# Patient Record
Sex: FEMALE | Race: WHITE | Employment: FULL TIME | URBAN - METROPOLITAN AREA
[De-identification: names, ages, dates, MRNs, and addresses within clinical notes are randomized per-mention and may not be internally consistent; named-entity substitution may affect disease eponyms.]

---

## 2019-01-11 DIAGNOSIS — Z34.00 ENCOUNTER FOR SUPERVISION OF NORMAL INTRAUTERINE PREGNANCY IN PRIMIGRAVIDA, ANTEPARTUM: ICD-10-CM

## 2019-01-11 LAB
ABO/RH: NORMAL
AMPHETAMINE SCREEN, URINE: NORMAL
ANTIBODY SCREEN: NORMAL
BACTERIA: ABNORMAL /HPF
BARBITURATE SCREEN URINE: NORMAL
BASOPHILS ABSOLUTE: 0 K/UL (ref 0–0.2)
BASOPHILS RELATIVE PERCENT: 0.4 %
BENZODIAZEPINE SCREEN, URINE: NORMAL
BILIRUBIN URINE: ABNORMAL
BLOOD, URINE: NEGATIVE
CANNABINOID SCREEN URINE: NORMAL
CLARITY: ABNORMAL
COCAINE METABOLITE SCREEN URINE: NORMAL
COLOR: ABNORMAL
EOSINOPHILS ABSOLUTE: 0 K/UL (ref 0–0.7)
EOSINOPHILS RELATIVE PERCENT: 0.1 %
EPITHELIAL CELLS, UA: ABNORMAL /HPF (ref 0–5)
GLUCOSE BLD-MCNC: 76 MG/DL (ref 74–109)
GLUCOSE URINE: NEGATIVE MG/DL
GONADOTROPIN, CHORIONIC (HCG) QUANT: NORMAL MIU/ML
HBA1C MFR BLD: 4.7 % (ref 4.8–5.9)
HCT VFR BLD CALC: 37.5 % (ref 37–47)
HEMOGLOBIN: 13 G/DL (ref 12–16)
HEPATITIS B SURFACE ANTIGEN INTERPRETATION: NORMAL
KETONES, URINE: 15 MG/DL
LEUKOCYTE ESTERASE, URINE: ABNORMAL
LYMPHOCYTES ABSOLUTE: 1.6 K/UL (ref 1–4.8)
LYMPHOCYTES RELATIVE PERCENT: 14.7 %
Lab: NORMAL
MCH RBC QN AUTO: 30 PG (ref 27–31.3)
MCHC RBC AUTO-ENTMCNC: 34.8 % (ref 33–37)
MCV RBC AUTO: 86.1 FL (ref 82–100)
MONOCYTES ABSOLUTE: 0.5 K/UL (ref 0.2–0.8)
MONOCYTES RELATIVE PERCENT: 4.5 %
NEUTROPHILS ABSOLUTE: 8.9 K/UL (ref 1.4–6.5)
NEUTROPHILS RELATIVE PERCENT: 80.3 %
NITRITE, URINE: NEGATIVE
OPIATE SCREEN URINE: NORMAL
PDW BLD-RTO: 11.9 % (ref 11.5–14.5)
PH UA: 5.5 (ref 5–9)
PHENCYCLIDINE SCREEN URINE: NORMAL
PLATELET # BLD: 228 K/UL (ref 130–400)
PROTEIN UA: ABNORMAL MG/DL
RBC # BLD: 4.35 M/UL (ref 4.2–5.4)
RUBELLA ANTIBODY IGG: 28.3 IU/ML
SPECIFIC GRAVITY UA: 1.03 (ref 1–1.03)
TSH SERPL DL<=0.05 MIU/L-ACNC: 2.08 UIU/ML (ref 0.27–4.2)
UROBILINOGEN, URINE: 0.2 E.U./DL
WBC # BLD: 11.1 K/UL (ref 4.8–10.8)
WBC UA: ABNORMAL /HPF (ref 0–5)

## 2019-01-13 LAB
GENITAL CULTURE, ROUTINE: NORMAL
HIV 1,2 COMBO ANTIGEN/ANTIBODY: NEGATIVE
RPR: NORMAL
URINE CULTURE, ROUTINE: NORMAL

## 2019-01-14 LAB — VZV IGG SER QL IA: 0.52

## 2019-01-17 LAB
C. TRACHOMATIS DNA,THIN PREP: NEGATIVE
N. GONORRHOEAE DNA, THIN PREP: NEGATIVE

## 2019-03-01 DIAGNOSIS — N30.00 ACUTE CYSTITIS WITHOUT HEMATURIA: ICD-10-CM

## 2019-03-01 LAB
BACTERIA: ABNORMAL /HPF
BILIRUBIN URINE: NEGATIVE
BLOOD, URINE: ABNORMAL
CLARITY: ABNORMAL
COLOR: YELLOW
EPITHELIAL CELLS, UA: >100 /HPF (ref 0–5)
GLUCOSE URINE: NEGATIVE MG/DL
KETONES, URINE: ABNORMAL MG/DL
LEUKOCYTE ESTERASE, URINE: ABNORMAL
NITRITE, URINE: NEGATIVE
PH UA: 5.5 (ref 5–9)
PROTEIN UA: 30 MG/DL
RBC UA: ABNORMAL /HPF (ref 0–2)
SPECIFIC GRAVITY UA: 1.03 (ref 1–1.03)
UROBILINOGEN, URINE: 1 E.U./DL
WBC UA: >100 /HPF (ref 0–5)

## 2019-03-03 LAB
ORGANISM: ABNORMAL
URINE CULTURE, ROUTINE: ABNORMAL
URINE CULTURE, ROUTINE: ABNORMAL

## 2019-06-20 ENCOUNTER — ROUTINE PRENATAL (OUTPATIENT)
Dept: OBGYN CLINIC | Age: 24
End: 2019-06-20

## 2019-06-20 VITALS — SYSTOLIC BLOOD PRESSURE: 112 MMHG | BODY MASS INDEX: 26.04 KG/M2 | WEIGHT: 147 LBS | DIASTOLIC BLOOD PRESSURE: 64 MMHG

## 2019-06-20 DIAGNOSIS — Z34.00 ENCOUNTER FOR SUPERVISION OF NORMAL INTRAUTERINE PREGNANCY IN PRIMIGRAVIDA, ANTEPARTUM: Primary | ICD-10-CM

## 2019-06-20 LAB
BILIRUBIN, POC: NORMAL
BLOOD URINE, POC: NORMAL
CLARITY, POC: NORMAL
COLOR, POC: NORMAL
GLUCOSE URINE, POC: NORMAL
KETONES, POC: NORMAL
LEUKOCYTE EST, POC: NORMAL
NITRITE, POC: NORMAL
PH, POC: NORMAL
PROTEIN, POC: NORMAL
SPECIFIC GRAVITY, POC: NORMAL
UROBILINOGEN, POC: NORMAL

## 2019-06-20 PROCEDURE — 0502F SUBSEQUENT PRENATAL CARE: CPT | Performed by: OBSTETRICS & GYNECOLOGY

## 2019-06-20 NOTE — PROGRESS NOTES
Still working at [employer] as a .  Doing ok with it but getting more uncomfortable  +fm  No lof vb,uc  fht 145  Gtt was normal  hgb 9.8 taking iron  Fu 2 weeks  Ok to continue working,rest prn and increase fluids

## 2019-07-09 ENCOUNTER — ROUTINE PRENATAL (OUTPATIENT)
Dept: OBGYN CLINIC | Age: 24
End: 2019-07-09

## 2019-07-09 VITALS — SYSTOLIC BLOOD PRESSURE: 92 MMHG | WEIGHT: 146 LBS | DIASTOLIC BLOOD PRESSURE: 56 MMHG | BODY MASS INDEX: 25.86 KG/M2

## 2019-07-09 DIAGNOSIS — Z34.93 PRENATAL CARE IN THIRD TRIMESTER: Primary | ICD-10-CM

## 2019-07-09 PROCEDURE — 0502F SUBSEQUENT PRENATAL CARE: CPT | Performed by: OBSTETRICS & GYNECOLOGY

## 2019-07-16 ENCOUNTER — HOSPITAL ENCOUNTER (OUTPATIENT)
Age: 24
Discharge: HOME | End: 2019-07-16
Attending: OBSTETRICS & GYNECOLOGY | Admitting: OBSTETRICS & GYNECOLOGY
Payer: COMMERCIAL

## 2019-07-16 VITALS — WEIGHT: 152.5 LBS | HEIGHT: 63 IN | RESPIRATION RATE: 16 BRPM | TEMPERATURE: 98.9 F | BODY MASS INDEX: 27.02 KG/M2

## 2019-07-16 PROCEDURE — 99282 EMERGENCY DEPT VISIT SF MDM: CPT | Performed by: OBSTETRICS & GYNECOLOGY

## 2019-07-16 PROCEDURE — 99283 EMERGENCY DEPT VISIT LOW MDM: CPT

## 2019-07-16 PROCEDURE — 59025 FETAL NON-STRESS TEST: CPT | Performed by: OBSTETRICS & GYNECOLOGY

## 2019-07-18 ENCOUNTER — ROUTINE PRENATAL (OUTPATIENT)
Dept: OBGYN CLINIC | Age: 24
End: 2019-07-18

## 2019-07-18 VITALS — SYSTOLIC BLOOD PRESSURE: 118 MMHG | WEIGHT: 154 LBS | DIASTOLIC BLOOD PRESSURE: 62 MMHG | BODY MASS INDEX: 27.28 KG/M2

## 2019-07-18 DIAGNOSIS — Z34.93 PRENATAL CARE IN THIRD TRIMESTER: ICD-10-CM

## 2019-07-18 DIAGNOSIS — Z34.93 PRENATAL CARE IN THIRD TRIMESTER: Primary | ICD-10-CM

## 2019-07-18 PROCEDURE — 0502F SUBSEQUENT PRENATAL CARE: CPT | Performed by: OBSTETRICS & GYNECOLOGY

## 2019-07-21 LAB — GENITAL CULTURE, ROUTINE: NORMAL

## 2019-07-25 ENCOUNTER — ROUTINE PRENATAL (OUTPATIENT)
Dept: OBGYN CLINIC | Age: 24
End: 2019-07-25

## 2019-07-25 VITALS — SYSTOLIC BLOOD PRESSURE: 114 MMHG | BODY MASS INDEX: 27.28 KG/M2 | DIASTOLIC BLOOD PRESSURE: 64 MMHG | WEIGHT: 154 LBS

## 2019-07-25 DIAGNOSIS — M54.50 LOW BACK PAIN DURING PREGNANCY, THIRD TRIMESTER: ICD-10-CM

## 2019-07-25 DIAGNOSIS — O26.893 LOW BACK PAIN DURING PREGNANCY, THIRD TRIMESTER: ICD-10-CM

## 2019-07-25 DIAGNOSIS — Z34.93 PRENATAL CARE IN THIRD TRIMESTER: Primary | ICD-10-CM

## 2019-07-25 PROCEDURE — 0502F SUBSEQUENT PRENATAL CARE: CPT | Performed by: OBSTETRICS & GYNECOLOGY

## 2019-07-25 NOTE — PROGRESS NOTES
Fatigue, otherwise well Normal vision: sees adequately in most situations; can see medication labels, newsprint

## 2019-07-31 ENCOUNTER — TELEPHONE (OUTPATIENT)
Dept: OBGYN CLINIC | Age: 24
End: 2019-07-31

## 2019-07-31 ENCOUNTER — HOSPITAL ENCOUNTER (OUTPATIENT)
Age: 24
Discharge: HOME | End: 2019-07-31
Attending: OBSTETRICS & GYNECOLOGY | Admitting: OBSTETRICS & GYNECOLOGY
Payer: COMMERCIAL

## 2019-07-31 VITALS
HEIGHT: 63 IN | WEIGHT: 154 LBS | SYSTOLIC BLOOD PRESSURE: 99 MMHG | RESPIRATION RATE: 20 BRPM | BODY MASS INDEX: 27.29 KG/M2 | DIASTOLIC BLOOD PRESSURE: 65 MMHG | TEMPERATURE: 99.5 F | HEART RATE: 87 BPM

## 2019-07-31 LAB
AMPHETAMINE SCREEN, URINE: NORMAL
BACTERIA: ABNORMAL /HPF
BARBITURATE SCREEN URINE: NORMAL
BENZODIAZEPINE SCREEN, URINE: NORMAL
BILIRUBIN URINE: NEGATIVE
BLOOD, URINE: NEGATIVE
CANNABINOID SCREEN URINE: NORMAL
CLARITY: ABNORMAL
COCAINE METABOLITE SCREEN URINE: NORMAL
COLOR: ABNORMAL
EPITHELIAL CELLS, UA: ABNORMAL /HPF (ref 0–5)
GLUCOSE URINE: NEGATIVE MG/DL
KETONES, URINE: NEGATIVE MG/DL
LEUKOCYTE ESTERASE, URINE: ABNORMAL
Lab: NORMAL
NITRITE, URINE: NEGATIVE
OPIATE SCREEN URINE: NORMAL
PH UA: 7 (ref 5–9)
PHENCYCLIDINE SCREEN URINE: NORMAL
PLACENTA ALPHA MICROGLOBULIN-1: NEGATIVE
PROTEIN UA: ABNORMAL MG/DL
RBC UA: ABNORMAL /HPF (ref 0–2)
SPECIFIC GRAVITY UA: 1.02 (ref 1–1.03)
UROBILINOGEN, URINE: 1 E.U./DL
WBC UA: >100 /HPF (ref 0–5)

## 2019-07-31 PROCEDURE — 99283 EMERGENCY DEPT VISIT LOW MDM: CPT | Performed by: OBSTETRICS & GYNECOLOGY

## 2019-07-31 PROCEDURE — 80307 DRUG TEST PRSMV CHEM ANLYZR: CPT

## 2019-07-31 PROCEDURE — 84112 EVAL AMNIOTIC FLUID PROTEIN: CPT

## 2019-07-31 PROCEDURE — 99283 EMERGENCY DEPT VISIT LOW MDM: CPT

## 2019-07-31 PROCEDURE — 81001 URINALYSIS AUTO W/SCOPE: CPT

## 2019-07-31 PROCEDURE — 59025 FETAL NON-STRESS TEST: CPT | Performed by: OBSTETRICS & GYNECOLOGY

## 2019-07-31 RX ORDER — NITROFURANTOIN 25; 75 MG/1; MG/1
100 CAPSULE ORAL 2 TIMES DAILY
Qty: 20 CAPSULE | Refills: 0 | Status: ON HOLD | OUTPATIENT
Start: 2019-07-31 | End: 2019-08-09 | Stop reason: HOSPADM

## 2019-07-31 NOTE — FLOWSHEET NOTE
Amnisure completed and sent to lab. Upon returning to room pt. C/O feeling lightheaded and nauseated HOB down and positioned to R Side. Cool compresses to head and neck area.

## 2019-07-31 NOTE — TELEPHONE ENCOUNTER
Pt is 37w3d. Pt believes she is leaking and having a lot of discharge. I advised pt to go to [facility] labor and delivery.

## 2019-08-01 ENCOUNTER — ROUTINE PRENATAL (OUTPATIENT)
Dept: OBGYN CLINIC | Age: 24
End: 2019-08-01

## 2019-08-01 VITALS — BODY MASS INDEX: 27.99 KG/M2 | SYSTOLIC BLOOD PRESSURE: 114 MMHG | WEIGHT: 158 LBS | DIASTOLIC BLOOD PRESSURE: 62 MMHG

## 2019-08-01 DIAGNOSIS — G47.00 INSOMNIA, UNSPECIFIED TYPE: ICD-10-CM

## 2019-08-01 DIAGNOSIS — Z34.93 PRENATAL CARE IN THIRD TRIMESTER: Primary | ICD-10-CM

## 2019-08-01 PROCEDURE — 0502F SUBSEQUENT PRENATAL CARE: CPT | Performed by: OBSTETRICS & GYNECOLOGY

## 2019-08-01 NOTE — PROGRESS NOTES
Patient was seen yesterday in labor and delivery thought her membranes ruptured but that was negative  Patient not sleeping well  Positive fetal movement fetal heart tones 141  No loss of fluid or vaginal bleeding  Advised to try Benadryl 25 mg at bedtime melatonin or Unisom patient to follow-up in 1 week

## 2019-08-05 RX ORDER — BUTALBITAL, ACETAMINOPHEN AND CAFFEINE 50; 325; 40 MG/1; MG/1; MG/1
TABLET ORAL
Qty: 30 TABLET | Refills: 3 | Status: ON HOLD | OUTPATIENT
Start: 2019-08-05 | End: 2019-08-09 | Stop reason: HOSPADM

## 2019-08-09 ENCOUNTER — HOSPITAL ENCOUNTER (OUTPATIENT)
Age: 24
Setting detail: OBSERVATION
Discharge: HOME | End: 2019-08-09
Attending: OBSTETRICS & GYNECOLOGY | Admitting: OBSTETRICS & GYNECOLOGY
Payer: COMMERCIAL

## 2019-08-09 VITALS
RESPIRATION RATE: 16 BRPM | DIASTOLIC BLOOD PRESSURE: 61 MMHG | TEMPERATURE: 98.6 F | HEART RATE: 90 BPM | SYSTOLIC BLOOD PRESSURE: 104 MMHG

## 2019-08-09 LAB
ALBUMIN SERPL-MCNC: 3.3 G/DL (ref 3.5–4.6)
ALP BLD-CCNC: 145 U/L (ref 40–130)
ALT SERPL-CCNC: 8 U/L (ref 0–33)
AMPHETAMINE SCREEN, URINE: NORMAL
ANION GAP SERPL CALCULATED.3IONS-SCNC: 13 MEQ/L (ref 9–15)
AST SERPL-CCNC: 13 U/L (ref 0–35)
BACTERIA: ABNORMAL /HPF
BARBITURATE SCREEN URINE: NORMAL
BASOPHILS ABSOLUTE: 0.1 K/UL (ref 0–0.2)
BASOPHILS RELATIVE PERCENT: 0.8 %
BENZODIAZEPINE SCREEN, URINE: NORMAL
BILIRUB SERPL-MCNC: 0.3 MG/DL (ref 0.2–0.7)
BILIRUBIN URINE: NEGATIVE
BLOOD, URINE: NEGATIVE
BUN BLDV-MCNC: 6 MG/DL (ref 6–20)
CALCIUM SERPL-MCNC: 8.6 MG/DL (ref 8.5–9.9)
CANNABINOID SCREEN URINE: NORMAL
CHLORIDE BLD-SCNC: 105 MEQ/L (ref 95–107)
CLARITY: CLEAR
CO2: 21 MEQ/L (ref 20–31)
COCAINE METABOLITE SCREEN URINE: NORMAL
COLOR: YELLOW
CREAT SERPL-MCNC: 0.43 MG/DL (ref 0.5–0.9)
EOSINOPHILS ABSOLUTE: 0 K/UL (ref 0–0.7)
EOSINOPHILS RELATIVE PERCENT: 0.5 %
EPITHELIAL CELLS, UA: ABNORMAL /HPF (ref 0–5)
GFR AFRICAN AMERICAN: >60
GFR NON-AFRICAN AMERICAN: >60
GLOBULIN: 3 G/DL (ref 2.3–3.5)
GLUCOSE BLD-MCNC: 76 MG/DL (ref 70–99)
GLUCOSE URINE: NEGATIVE MG/DL
HCT VFR BLD CALC: 27.8 % (ref 37–47)
HEMOGLOBIN: 9.6 G/DL (ref 12–16)
HYALINE CASTS: ABNORMAL /HPF (ref 0–5)
KETONES, URINE: NEGATIVE MG/DL
LEUKOCYTE ESTERASE, URINE: ABNORMAL
LYMPHOCYTES ABSOLUTE: 2.4 K/UL (ref 1–4.8)
LYMPHOCYTES RELATIVE PERCENT: 24.9 %
Lab: NORMAL
MCH RBC QN AUTO: 27.2 PG (ref 27–31.3)
MCHC RBC AUTO-ENTMCNC: 34.5 % (ref 33–37)
MCV RBC AUTO: 79 FL (ref 82–100)
MONOCYTES ABSOLUTE: 0.7 K/UL (ref 0.2–0.8)
MONOCYTES RELATIVE PERCENT: 6.9 %
NEUTROPHILS ABSOLUTE: 6.5 K/UL (ref 1.4–6.5)
NEUTROPHILS RELATIVE PERCENT: 66.9 %
NITRITE, URINE: NEGATIVE
OPIATE SCREEN URINE: NORMAL
PDW BLD-RTO: 14 % (ref 11.5–14.5)
PH UA: 7 (ref 5–9)
PHENCYCLIDINE SCREEN URINE: NORMAL
PLATELET # BLD: 164 K/UL (ref 130–400)
POTASSIUM SERPL-SCNC: 3.9 MEQ/L (ref 3.4–4.9)
PROTEIN UA: NEGATIVE MG/DL
RBC # BLD: 3.52 M/UL (ref 4.2–5.4)
RBC UA: ABNORMAL /HPF (ref 0–5)
SODIUM BLD-SCNC: 139 MEQ/L (ref 135–144)
SPECIFIC GRAVITY UA: 1.01 (ref 1–1.03)
TOTAL PROTEIN: 6.3 G/DL (ref 6.3–8)
UROBILINOGEN, URINE: 0.2 E.U./DL
WBC # BLD: 9.8 K/UL (ref 4.8–10.8)
WBC UA: ABNORMAL /HPF (ref 0–5)

## 2019-08-09 PROCEDURE — 36415 COLL VENOUS BLD VENIPUNCTURE: CPT

## 2019-08-09 PROCEDURE — 85025 COMPLETE CBC W/AUTO DIFF WBC: CPT

## 2019-08-09 PROCEDURE — 81001 URINALYSIS AUTO W/SCOPE: CPT

## 2019-08-09 PROCEDURE — 2580000003 HC RX 258: Performed by: OBSTETRICS & GYNECOLOGY

## 2019-08-09 PROCEDURE — 96366 THER/PROPH/DIAG IV INF ADDON: CPT

## 2019-08-09 PROCEDURE — 6360000002 HC RX W HCPCS: Performed by: OBSTETRICS & GYNECOLOGY

## 2019-08-09 PROCEDURE — 87086 URINE CULTURE/COLONY COUNT: CPT

## 2019-08-09 PROCEDURE — 99284 EMERGENCY DEPT VISIT MOD MDM: CPT

## 2019-08-09 PROCEDURE — 6370000000 HC RX 637 (ALT 250 FOR IP): Performed by: OBSTETRICS & GYNECOLOGY

## 2019-08-09 PROCEDURE — 99218 PR INITIAL OBSERVATION CARE/DAY 30 MINUTES: CPT | Performed by: OBSTETRICS & GYNECOLOGY

## 2019-08-09 PROCEDURE — 80053 COMPREHEN METABOLIC PANEL: CPT

## 2019-08-09 PROCEDURE — 96365 THER/PROPH/DIAG IV INF INIT: CPT

## 2019-08-09 PROCEDURE — 80307 DRUG TEST PRSMV CHEM ANLYZR: CPT

## 2019-08-09 PROCEDURE — G0378 HOSPITAL OBSERVATION PER HR: HCPCS

## 2019-08-09 RX ORDER — SODIUM CHLORIDE, SODIUM LACTATE, POTASSIUM CHLORIDE, CALCIUM CHLORIDE 600; 310; 30; 20 MG/100ML; MG/100ML; MG/100ML; MG/100ML
INJECTION, SOLUTION INTRAVENOUS CONTINUOUS
Status: DISCONTINUED | OUTPATIENT
Start: 2019-08-09 | End: 2019-08-09 | Stop reason: HOSPADM

## 2019-08-09 RX ORDER — ACETAMINOPHEN 325 MG/1
650 TABLET ORAL EVERY 4 HOURS PRN
Status: DISCONTINUED | OUTPATIENT
Start: 2019-08-09 | End: 2019-08-09 | Stop reason: HOSPADM

## 2019-08-09 RX ORDER — CEPHALEXIN 500 MG/1
500 CAPSULE ORAL 2 TIMES DAILY
Qty: 14 CAPSULE | Refills: 0 | Status: ON HOLD | OUTPATIENT
Start: 2019-08-09 | End: 2020-11-03 | Stop reason: ALTCHOICE

## 2019-08-09 RX ADMIN — SODIUM CHLORIDE, POTASSIUM CHLORIDE, SODIUM LACTATE AND CALCIUM CHLORIDE: 600; 310; 30; 20 INJECTION, SOLUTION INTRAVENOUS at 13:07

## 2019-08-09 RX ADMIN — SODIUM CHLORIDE, POTASSIUM CHLORIDE, SODIUM LACTATE AND CALCIUM CHLORIDE: 600; 310; 30; 20 INJECTION, SOLUTION INTRAVENOUS at 10:51

## 2019-08-09 RX ADMIN — DEXTROSE MONOHYDRATE 1 G: 5 INJECTION INTRAVENOUS at 11:05

## 2019-08-09 RX ADMIN — ACETAMINOPHEN 650 MG: 325 TABLET ORAL at 09:41

## 2019-08-09 ASSESSMENT — PAIN SCALES - GENERAL: PAINLEVEL_OUTOF10: 6

## 2019-08-09 NOTE — H&P
Department of Obstetrics and Gynecology   History & Physical    Pt Name: Katherine Rodríguez  MRN: [id number] Acct #: [de-identified]  YOB: 1995  Estimated Date of Delivery: 19      HPI: The patient is a 27 y.o.  female whose ARMANDO was 19; now 44w7d gest and presents with ctx's and lower abdominal pain; seeing Dr. Praveen Mills for [facility]; currently on macrobid, 100 mg po bid for uti; neg maternal GBS status; has received iv hydration and ancef, 1 gm ivpb; was noted to be 1-2 cm dilated with nursing exam and this has not changed since OBS status began; will have patient d/c macrobid and d/c home on keflex, 500 mg po bid x's 7 days and f/u with ob/gyn next week; otherwise return to ob/er if labor ensues, etc.    Allergies: Allergies as of 2019    (No Known Allergies)       Medications:    Current Facility-Administered Medications:     acetaminophen (TYLENOL) tablet 650 mg, 650 mg, Oral, Q4H PRN, Martin Rose MD, 650 mg at 19 0941    lactated ringers infusion, , Intravenous, Continuous, Martin Rose MD, Last Rate: 125 mL/hr at 19 1307    ceFAZolin (ANCEF) 1 g in dextrose 5 % 50 mL IVPB (mini-bag), 1 g, Intravenous, Q8H, Martin Rose MD    OB History:     Gyn History: Denies h/o abnormal pap smear, h/o STDs. Past Medical History:   Past Medical History:   Diagnosis Date    Anemia     Anxiety     Depression        Past Surgical History: History reviewed. No pertinent surgical history. Social History:   Social History     Tobacco Use   Smoking Status Former Smoker   Smokeless Tobacco Never Used        Family History: Noncontributory; Denies h/o cancer. ROS:  Negative except as stated in HPI, denies nausea, vomiting, fever, chills, headache or dysuria.      PE:  Vitals:    19 1303   BP: (!) 98/51   Pulse: 73   Resp: 18   Temp:        General: well nourished, well developed, in no acute distress  CV: Normal heart sounds  Resp: breathing

## 2019-08-10 LAB — URINE CULTURE, ROUTINE: NORMAL

## 2019-08-13 ENCOUNTER — ANESTHESIA (OUTPATIENT)
Dept: LABOR AND DELIVERY | Age: 24
End: 2019-08-13
Payer: COMMERCIAL

## 2019-08-13 ENCOUNTER — HOSPITAL ENCOUNTER (INPATIENT)
Age: 24
LOS: 2 days | Discharge: HOME | End: 2019-08-15
Attending: OBSTETRICS & GYNECOLOGY | Admitting: OBSTETRICS & GYNECOLOGY
Payer: COMMERCIAL

## 2019-08-13 ENCOUNTER — ROUTINE PRENATAL (OUTPATIENT)
Dept: OBGYN CLINIC | Age: 24
End: 2019-08-13

## 2019-08-13 ENCOUNTER — ANESTHESIA EVENT (OUTPATIENT)
Dept: LABOR AND DELIVERY | Age: 24
End: 2019-08-13
Payer: COMMERCIAL

## 2019-08-13 VITALS — DIASTOLIC BLOOD PRESSURE: 64 MMHG | WEIGHT: 158 LBS | BODY MASS INDEX: 27.99 KG/M2 | SYSTOLIC BLOOD PRESSURE: 104 MMHG

## 2019-08-13 DIAGNOSIS — Z34.93 PRENATAL CARE IN THIRD TRIMESTER: Primary | ICD-10-CM

## 2019-08-13 PROBLEM — Z78.9 ADMITTED TO LABOR AND DELIVERY: Status: ACTIVE | Noted: 2019-08-13

## 2019-08-13 LAB
ABO/RH: NORMAL
ALBUMIN SERPL-MCNC: 3.7 G/DL (ref 3.5–4.6)
ALP BLD-CCNC: 159 U/L (ref 40–130)
ALT SERPL-CCNC: 9 U/L (ref 0–33)
AMPHETAMINE SCREEN, URINE: NORMAL
ANION GAP SERPL CALCULATED.3IONS-SCNC: 15 MEQ/L (ref 9–15)
ANTIBODY SCREEN: NORMAL
AST SERPL-CCNC: 17 U/L (ref 0–35)
BACTERIA: ABNORMAL /HPF
BARBITURATE SCREEN URINE: NORMAL
BASOPHILS ABSOLUTE: 0.1 K/UL (ref 0–0.2)
BASOPHILS RELATIVE PERCENT: 0.6 %
BENZODIAZEPINE SCREEN, URINE: NORMAL
BILIRUB SERPL-MCNC: 0.4 MG/DL (ref 0.2–0.7)
BILIRUBIN URINE: NEGATIVE
BLOOD, URINE: ABNORMAL
BUN BLDV-MCNC: 6 MG/DL (ref 6–20)
CALCIUM SERPL-MCNC: 8.9 MG/DL (ref 8.5–9.9)
CANNABINOID SCREEN URINE: NORMAL
CHLORIDE BLD-SCNC: 102 MEQ/L (ref 95–107)
CLARITY: ABNORMAL
CO2: 19 MEQ/L (ref 20–31)
COCAINE METABOLITE SCREEN URINE: NORMAL
COLOR: ABNORMAL
CREAT SERPL-MCNC: 0.4 MG/DL (ref 0.5–0.9)
EOSINOPHILS ABSOLUTE: 0 K/UL (ref 0–0.7)
EOSINOPHILS RELATIVE PERCENT: 0.3 %
EPITHELIAL CELLS, UA: ABNORMAL /HPF (ref 0–5)
GFR AFRICAN AMERICAN: >60
GFR NON-AFRICAN AMERICAN: >60
GLOBULIN: 3.1 G/DL (ref 2.3–3.5)
GLUCOSE BLD-MCNC: 73 MG/DL (ref 70–99)
GLUCOSE URINE: NEGATIVE MG/DL
HCT VFR BLD CALC: 28.8 % (ref 37–47)
HEMOGLOBIN: 9.7 G/DL (ref 12–16)
HEPATITIS B SURFACE ANTIGEN INTERPRETATION: NORMAL
KETONES, URINE: NEGATIVE MG/DL
LEUKOCYTE ESTERASE, URINE: ABNORMAL
LYMPHOCYTES ABSOLUTE: 2.1 K/UL (ref 1–4.8)
LYMPHOCYTES RELATIVE PERCENT: 21.4 %
Lab: NORMAL
MCH RBC QN AUTO: 26.1 PG (ref 27–31.3)
MCHC RBC AUTO-ENTMCNC: 33.6 % (ref 33–37)
MCV RBC AUTO: 77.6 FL (ref 82–100)
MONOCYTES ABSOLUTE: 0.7 K/UL (ref 0.2–0.8)
MONOCYTES RELATIVE PERCENT: 7.1 %
NEUTROPHILS ABSOLUTE: 7 K/UL (ref 1.4–6.5)
NEUTROPHILS RELATIVE PERCENT: 70.6 %
NITRITE, URINE: NEGATIVE
OPIATE SCREEN URINE: NORMAL
PDW BLD-RTO: 14 % (ref 11.5–14.5)
PH UA: 6.5 (ref 5–9)
PHENCYCLIDINE SCREEN URINE: NORMAL
PLATELET # BLD: 168 K/UL (ref 130–400)
POTASSIUM SERPL-SCNC: 3.8 MEQ/L (ref 3.4–4.9)
PROTEIN UA: NEGATIVE MG/DL
RBC # BLD: 3.71 M/UL (ref 4.2–5.4)
RBC UA: ABNORMAL /HPF (ref 0–5)
RENAL EPITHELIAL, UA: ABNORMAL /HPF
SODIUM BLD-SCNC: 136 MEQ/L (ref 135–144)
SPECIFIC GRAVITY UA: 1.02 (ref 1–1.03)
TOTAL PROTEIN: 6.8 G/DL (ref 6.3–8)
UROBILINOGEN, URINE: 1 E.U./DL
WBC # BLD: 10 K/UL (ref 4.8–10.8)
WBC UA: ABNORMAL /HPF (ref 0–5)

## 2019-08-13 PROCEDURE — G8419 CALC BMI OUT NRM PARAM NOF/U: HCPCS | Performed by: OBSTETRICS & GYNECOLOGY

## 2019-08-13 PROCEDURE — 86901 BLOOD TYPING SEROLOGIC RH(D): CPT

## 2019-08-13 PROCEDURE — 2500000003 HC RX 250 WO HCPCS: Performed by: NURSE ANESTHETIST, CERTIFIED REGISTERED

## 2019-08-13 PROCEDURE — 86592 SYPHILIS TEST NON-TREP QUAL: CPT

## 2019-08-13 PROCEDURE — 87340 HEPATITIS B SURFACE AG IA: CPT

## 2019-08-13 PROCEDURE — 6370000000 HC RX 637 (ALT 250 FOR IP): Performed by: OBSTETRICS & GYNECOLOGY

## 2019-08-13 PROCEDURE — 86850 RBC ANTIBODY SCREEN: CPT

## 2019-08-13 PROCEDURE — 81001 URINALYSIS AUTO W/SCOPE: CPT

## 2019-08-13 PROCEDURE — G8427 DOCREV CUR MEDS BY ELIG CLIN: HCPCS | Performed by: OBSTETRICS & GYNECOLOGY

## 2019-08-13 PROCEDURE — 6360000002 HC RX W HCPCS: Performed by: NURSE ANESTHETIST, CERTIFIED REGISTERED

## 2019-08-13 PROCEDURE — 80307 DRUG TEST PRSMV CHEM ANLYZR: CPT

## 2019-08-13 PROCEDURE — 2500000003 HC RX 250 WO HCPCS: Performed by: OBSTETRICS & GYNECOLOGY

## 2019-08-13 PROCEDURE — 3700000025 EPIDURAL BLOCK: Performed by: NURSE ANESTHETIST, CERTIFIED REGISTERED

## 2019-08-13 PROCEDURE — 2580000003 HC RX 258: Performed by: OBSTETRICS & GYNECOLOGY

## 2019-08-13 PROCEDURE — 86900 BLOOD TYPING SEROLOGIC ABO: CPT

## 2019-08-13 PROCEDURE — 36415 COLL VENOUS BLD VENIPUNCTURE: CPT

## 2019-08-13 PROCEDURE — 1036F TOBACCO NON-USER: CPT | Performed by: OBSTETRICS & GYNECOLOGY

## 2019-08-13 PROCEDURE — 1220000000 HC SEMI PRIVATE OB R&B

## 2019-08-13 PROCEDURE — 99223 1ST HOSP IP/OBS HIGH 75: CPT | Performed by: OBSTETRICS & GYNECOLOGY

## 2019-08-13 PROCEDURE — 0502F SUBSEQUENT PRENATAL CARE: CPT | Performed by: OBSTETRICS & GYNECOLOGY

## 2019-08-13 PROCEDURE — 80053 COMPREHEN METABOLIC PANEL: CPT

## 2019-08-13 PROCEDURE — 59200 INSERT CERVICAL DILATOR: CPT | Performed by: OBSTETRICS & GYNECOLOGY

## 2019-08-13 PROCEDURE — 85025 COMPLETE CBC W/AUTO DIFF WBC: CPT

## 2019-08-13 RX ORDER — FENTANYL CITRATE 50 UG/ML
INJECTION, SOLUTION INTRAMUSCULAR; INTRAVENOUS PRN
Status: DISCONTINUED | OUTPATIENT
Start: 2019-08-13 | End: 2019-08-14 | Stop reason: SDUPTHER

## 2019-08-13 RX ORDER — SODIUM CHLORIDE 0.9 % (FLUSH) 0.9 %
10 SYRINGE (ML) INJECTION PRN
Status: DISCONTINUED | OUTPATIENT
Start: 2019-08-13 | End: 2019-08-14

## 2019-08-13 RX ORDER — NALBUPHINE HCL 10 MG/ML
10 AMPUL (ML) INJECTION
Status: DISCONTINUED | OUTPATIENT
Start: 2019-08-13 | End: 2019-08-14

## 2019-08-13 RX ORDER — SODIUM CHLORIDE 0.9 % (FLUSH) 0.9 %
10 SYRINGE (ML) INJECTION EVERY 12 HOURS SCHEDULED
Status: DISCONTINUED | OUTPATIENT
Start: 2019-08-13 | End: 2019-08-14

## 2019-08-13 RX ORDER — ONDANSETRON 2 MG/ML
4 INJECTION INTRAMUSCULAR; INTRAVENOUS EVERY 6 HOURS PRN
Status: DISCONTINUED | OUTPATIENT
Start: 2019-08-13 | End: 2019-08-14

## 2019-08-13 RX ORDER — BUPIVACAINE HYDROCHLORIDE 2.5 MG/ML
INJECTION, SOLUTION EPIDURAL; INFILTRATION; INTRACAUDAL PRN
Status: DISCONTINUED | OUTPATIENT
Start: 2019-08-13 | End: 2019-08-14 | Stop reason: SDUPTHER

## 2019-08-13 RX ORDER — NALOXONE HYDROCHLORIDE 0.4 MG/ML
0.4 INJECTION, SOLUTION INTRAMUSCULAR; INTRAVENOUS; SUBCUTANEOUS PRN
Status: DISCONTINUED | OUTPATIENT
Start: 2019-08-13 | End: 2019-08-14

## 2019-08-13 RX ORDER — DOCUSATE SODIUM 100 MG/1
100 CAPSULE, LIQUID FILLED ORAL 2 TIMES DAILY
Status: DISCONTINUED | OUTPATIENT
Start: 2019-08-13 | End: 2019-08-14

## 2019-08-13 RX ORDER — SODIUM CHLORIDE, SODIUM LACTATE, POTASSIUM CHLORIDE, CALCIUM CHLORIDE 600; 310; 30; 20 MG/100ML; MG/100ML; MG/100ML; MG/100ML
INJECTION, SOLUTION INTRAVENOUS CONTINUOUS
Status: DISCONTINUED | OUTPATIENT
Start: 2019-08-13 | End: 2019-08-14

## 2019-08-13 RX ADMIN — DINOPROSTONE 10 MG: 10 INSERT VAGINAL at 13:16

## 2019-08-13 RX ADMIN — FENTANYL CITRATE 100 MCG: 50 INJECTION, SOLUTION INTRAMUSCULAR; INTRAVENOUS at 23:08

## 2019-08-13 RX ADMIN — SODIUM CHLORIDE, POTASSIUM CHLORIDE, SODIUM LACTATE AND CALCIUM CHLORIDE: 600; 310; 30; 20 INJECTION, SOLUTION INTRAVENOUS at 19:46

## 2019-08-13 RX ADMIN — SODIUM CHLORIDE, POTASSIUM CHLORIDE, SODIUM LACTATE AND CALCIUM CHLORIDE: 600; 310; 30; 20 INJECTION, SOLUTION INTRAVENOUS at 20:54

## 2019-08-13 RX ADMIN — FENTANYL CITRATE 100 MCG: 50 INJECTION, SOLUTION INTRAMUSCULAR; INTRAVENOUS at 23:43

## 2019-08-13 RX ADMIN — BUPIVACAINE HYDROCHLORIDE 2 ML: 2.5 INJECTION, SOLUTION EPIDURAL; INFILTRATION; INTRACAUDAL at 23:08

## 2019-08-13 RX ADMIN — SODIUM CHLORIDE, POTASSIUM CHLORIDE, SODIUM LACTATE AND CALCIUM CHLORIDE: 600; 310; 30; 20 INJECTION, SOLUTION INTRAVENOUS at 21:57

## 2019-08-13 RX ADMIN — Medication 10 ML/HR: at 21:19

## 2019-08-13 RX ADMIN — Medication 5 ML: at 21:16

## 2019-08-14 LAB — RPR: NORMAL

## 2019-08-14 PROCEDURE — 0HQ9XZZ REPAIR PERINEUM SKIN, EXTERNAL APPROACH: ICD-10-PCS | Performed by: OBSTETRICS & GYNECOLOGY

## 2019-08-14 PROCEDURE — 3E0P7VZ INTRODUCTION OF HORMONE INTO FEMALE REPRODUCTIVE, VIA NATURAL OR ARTIFICIAL OPENING: ICD-10-PCS | Performed by: OBSTETRICS & GYNECOLOGY

## 2019-08-14 PROCEDURE — 6370000000 HC RX 637 (ALT 250 FOR IP): Performed by: OBSTETRICS & GYNECOLOGY

## 2019-08-14 PROCEDURE — 1220000000 HC SEMI PRIVATE OB R&B

## 2019-08-14 PROCEDURE — 6360000002 HC RX W HCPCS

## 2019-08-14 PROCEDURE — 7200000001 HC VAGINAL DELIVERY

## 2019-08-14 PROCEDURE — 59409 OBSTETRICAL CARE: CPT | Performed by: OBSTETRICS & GYNECOLOGY

## 2019-08-14 RX ORDER — METHYLERGONOVINE MALEATE 0.2 MG/ML
INJECTION INTRAVENOUS
Status: COMPLETED
Start: 2019-08-14 | End: 2019-08-14

## 2019-08-14 RX ORDER — SODIUM CHLORIDE 0.9 % (FLUSH) 0.9 %
10 SYRINGE (ML) INJECTION PRN
Status: DISCONTINUED | OUTPATIENT
Start: 2019-08-14 | End: 2019-08-15 | Stop reason: HOSPADM

## 2019-08-14 RX ORDER — SODIUM CHLORIDE, SODIUM LACTATE, POTASSIUM CHLORIDE, CALCIUM CHLORIDE 600; 310; 30; 20 MG/100ML; MG/100ML; MG/100ML; MG/100ML
INJECTION, SOLUTION INTRAVENOUS CONTINUOUS
Status: DISCONTINUED | OUTPATIENT
Start: 2019-08-14 | End: 2019-08-15 | Stop reason: HOSPADM

## 2019-08-14 RX ORDER — DOCUSATE SODIUM 100 MG/1
100 CAPSULE, LIQUID FILLED ORAL 2 TIMES DAILY
Status: DISCONTINUED | OUTPATIENT
Start: 2019-08-14 | End: 2019-08-15 | Stop reason: HOSPADM

## 2019-08-14 RX ORDER — ONDANSETRON 4 MG/1
8 TABLET, FILM COATED ORAL EVERY 8 HOURS PRN
Status: DISCONTINUED | OUTPATIENT
Start: 2019-08-14 | End: 2019-08-15 | Stop reason: HOSPADM

## 2019-08-14 RX ORDER — FAMOTIDINE 20 MG/1
20 TABLET, FILM COATED ORAL 2 TIMES DAILY
Status: DISCONTINUED | OUTPATIENT
Start: 2019-08-14 | End: 2019-08-15 | Stop reason: HOSPADM

## 2019-08-14 RX ORDER — HYDROCODONE BITARTRATE AND ACETAMINOPHEN 5; 325 MG/1; MG/1
2 TABLET ORAL EVERY 4 HOURS PRN
Status: DISCONTINUED | OUTPATIENT
Start: 2019-08-14 | End: 2019-08-15 | Stop reason: HOSPADM

## 2019-08-14 RX ORDER — HYDROCODONE BITARTRATE AND ACETAMINOPHEN 5; 325 MG/1; MG/1
1 TABLET ORAL EVERY 4 HOURS PRN
Status: DISCONTINUED | OUTPATIENT
Start: 2019-08-14 | End: 2019-08-15 | Stop reason: HOSPADM

## 2019-08-14 RX ORDER — LANOLIN 100 %
OINTMENT (GRAM) TOPICAL PRN
Status: DISCONTINUED | OUTPATIENT
Start: 2019-08-14 | End: 2019-08-15 | Stop reason: HOSPADM

## 2019-08-14 RX ORDER — METHYLERGONOVINE MALEATE 0.2 MG/ML
200 INJECTION INTRAVENOUS ONCE
Status: COMPLETED | OUTPATIENT
Start: 2019-08-14 | End: 2019-08-14

## 2019-08-14 RX ORDER — SODIUM CHLORIDE 0.9 % (FLUSH) 0.9 %
10 SYRINGE (ML) INJECTION EVERY 12 HOURS SCHEDULED
Status: DISCONTINUED | OUTPATIENT
Start: 2019-08-14 | End: 2019-08-15 | Stop reason: HOSPADM

## 2019-08-14 RX ORDER — IBUPROFEN 800 MG/1
800 TABLET ORAL EVERY 8 HOURS
Status: DISCONTINUED | OUTPATIENT
Start: 2019-08-14 | End: 2019-08-15 | Stop reason: HOSPADM

## 2019-08-14 RX ADMIN — BENZOCAINE AND LEVOMENTHOL: 200; 5 SPRAY TOPICAL at 02:49

## 2019-08-14 RX ADMIN — HYDROCODONE BITARTRATE AND ACETAMINOPHEN 2 TABLET: 5; 325 TABLET ORAL at 12:33

## 2019-08-14 RX ADMIN — METHYLERGONOVINE MALEATE 200 MCG: 0.2 INJECTION INTRAVENOUS at 01:13

## 2019-08-14 RX ADMIN — IBUPROFEN 800 MG: 800 TABLET, FILM COATED ORAL at 16:31

## 2019-08-14 RX ADMIN — BENZOCAINE AND LEVOMENTHOL: 200; 5 SPRAY TOPICAL at 11:29

## 2019-08-14 RX ADMIN — HYDROCODONE BITARTRATE AND ACETAMINOPHEN 2 TABLET: 5; 325 TABLET ORAL at 03:14

## 2019-08-14 RX ADMIN — HYDROCODONE BITARTRATE AND ACETAMINOPHEN 1 TABLET: 5; 325 TABLET ORAL at 23:47

## 2019-08-14 RX ADMIN — HYDROCODONE BITARTRATE AND ACETAMINOPHEN 2 TABLET: 5; 325 TABLET ORAL at 07:18

## 2019-08-14 RX ADMIN — IBUPROFEN 800 MG: 800 TABLET, FILM COATED ORAL at 06:31

## 2019-08-14 RX ADMIN — Medication 40 EACH: at 11:29

## 2019-08-14 RX ADMIN — HYDROCODONE BITARTRATE AND ACETAMINOPHEN 2 TABLET: 5; 325 TABLET ORAL at 17:19

## 2019-08-14 RX ADMIN — DOCUSATE SODIUM 100 MG: 100 CAPSULE, LIQUID FILLED ORAL at 07:17

## 2019-08-14 ASSESSMENT — PAIN SCALES - GENERAL
PAINLEVEL_OUTOF10: 4
PAINLEVEL_OUTOF10: 5
PAINLEVEL_OUTOF10: 4
PAINLEVEL_OUTOF10: 5
PAINLEVEL_OUTOF10: 6

## 2019-08-14 NOTE — L&D DELIVERY SUMMARY NOTE
Baby [facility] [[id number]]                PMH:  Past Medical History:   Diagnosis Date    Anemia     Anxiety     Depression        Daxa Ruiz 8/14/2019 12:38 AM

## 2019-08-14 NOTE — PROGRESS NOTES
Postpartum Day 0: Vaginal Delivery    The patient feels tired. Pain is well controlled with current medications. Baby is feeding via breast.     Objective:        Vitals:    08/14/19 0406   BP: 131/74   Pulse: 116   Resp: 18   Temp: 98.6 °F (37 °C)   SpO2:          Lab Results   Component Value Date    WBC 10.0 08/13/2019    HGB 9.7 (L) 08/13/2019    HCT 28.8 (L) 08/13/2019    MCV 77.6 (L) 08/13/2019     08/13/2019       General:    alert, appears stated age and cooperative   Lochia:  appropriate   Uterine    firm   DVT Evaluation:  No evidence of DVT seen on physical exam.     Assessment:     Status post Vaginal Delivery. good    Plan:     Continue current care.

## 2019-08-15 VITALS
SYSTOLIC BLOOD PRESSURE: 121 MMHG | OXYGEN SATURATION: 99 % | DIASTOLIC BLOOD PRESSURE: 59 MMHG | RESPIRATION RATE: 16 BRPM | HEIGHT: 63 IN | TEMPERATURE: 98.4 F | BODY MASS INDEX: 27.54 KG/M2 | WEIGHT: 155.44 LBS | HEART RATE: 90 BPM

## 2019-08-15 LAB — HEMOGLOBIN: 7.9 G/DL (ref 12–16)

## 2019-08-15 PROCEDURE — 36415 COLL VENOUS BLD VENIPUNCTURE: CPT

## 2019-08-15 PROCEDURE — 85018 HEMOGLOBIN: CPT

## 2019-08-15 PROCEDURE — 6370000000 HC RX 637 (ALT 250 FOR IP): Performed by: OBSTETRICS & GYNECOLOGY

## 2019-08-15 PROCEDURE — 99024 POSTOP FOLLOW-UP VISIT: CPT | Performed by: OBSTETRICS & GYNECOLOGY

## 2019-08-15 RX ORDER — IBUPROFEN 600 MG/1
600 TABLET ORAL 4 TIMES DAILY PRN
Qty: 60 TABLET | Refills: 0 | Status: SHIPPED | OUTPATIENT
Start: 2019-08-15 | End: 2019-08-31 | Stop reason: SDUPTHER

## 2019-08-15 RX ADMIN — FAMOTIDINE 20 MG: 20 TABLET ORAL at 10:07

## 2019-08-15 RX ADMIN — IBUPROFEN 800 MG: 800 TABLET, FILM COATED ORAL at 01:51

## 2019-08-15 RX ADMIN — DOCUSATE SODIUM 100 MG: 100 CAPSULE, LIQUID FILLED ORAL at 10:07

## 2019-08-15 RX ADMIN — IBUPROFEN 800 MG: 800 TABLET, FILM COATED ORAL at 10:07

## 2019-08-15 ASSESSMENT — PAIN SCALES - GENERAL
PAINLEVEL_OUTOF10: 3
PAINLEVEL_OUTOF10: 3

## 2019-08-15 NOTE — DISCHARGE SUMMARY
0      Prenatal Multivit-Min-Fe-FA (PRENATAL 1 + IRON PO) Take by mouth      ferrous sulfate 325 (65 Fe) MG tablet Take 1 tablet by mouth 2 times daily  Qty: 180 tablet, Refills: 1               Discharge to: Home        Discharge Date: 8/15

## 2019-08-30 RX ORDER — BUTALBITAL, ACETAMINOPHEN AND CAFFEINE 50; 325; 40 MG/1; MG/1; MG/1
TABLET ORAL
Qty: 30 TABLET | Refills: 3 | Status: SHIPPED | OUTPATIENT
Start: 2019-08-30 | End: 2019-09-22 | Stop reason: SDUPTHER

## 2019-09-03 RX ORDER — IBUPROFEN 600 MG/1
600 TABLET ORAL 4 TIMES DAILY PRN
Qty: 60 TABLET | Refills: 0 | Status: SHIPPED | OUTPATIENT
Start: 2019-09-03 | End: 2021-03-29

## 2019-09-23 RX ORDER — BUTALBITAL, ACETAMINOPHEN AND CAFFEINE 50; 325; 40 MG/1; MG/1; MG/1
TABLET ORAL
Qty: 30 TABLET | Refills: 3 | Status: SHIPPED | OUTPATIENT
Start: 2019-09-23 | End: 2019-10-09 | Stop reason: SDUPTHER

## 2019-10-10 RX ORDER — BUTALBITAL, ACETAMINOPHEN AND CAFFEINE 50; 325; 40 MG/1; MG/1; MG/1
TABLET ORAL
Qty: 30 TABLET | Refills: 3 | Status: SHIPPED | OUTPATIENT
Start: 2019-10-10 | End: 2019-10-28 | Stop reason: SDUPTHER

## 2019-10-11 ENCOUNTER — POSTPARTUM VISIT (OUTPATIENT)
Dept: OBGYN CLINIC | Age: 24
End: 2019-10-11

## 2019-10-11 VITALS
DIASTOLIC BLOOD PRESSURE: 68 MMHG | SYSTOLIC BLOOD PRESSURE: 112 MMHG | BODY MASS INDEX: 24.8 KG/M2 | HEIGHT: 63 IN | WEIGHT: 140 LBS

## 2019-10-11 PROCEDURE — 0503F POSTPARTUM CARE VISIT: CPT | Performed by: OBSTETRICS & GYNECOLOGY

## 2019-10-11 ASSESSMENT — ENCOUNTER SYMPTOMS
WHEEZING: 0
ABDOMINAL DISTENTION: 0
ABDOMINAL PAIN: 0
CONSTIPATION: 0
TROUBLE SWALLOWING: 0
COUGH: 0
SHORTNESS OF BREATH: 0
BLOOD IN STOOL: 0
VOICE CHANGE: 0
BACK PAIN: 0
CHEST TIGHTNESS: 0
VOMITING: 0
COLOR CHANGE: 0
SORE THROAT: 0
NAUSEA: 0

## 2019-10-28 RX ORDER — BUTALBITAL, ACETAMINOPHEN AND CAFFEINE 50; 325; 40 MG/1; MG/1; MG/1
TABLET ORAL
Qty: 30 TABLET | Refills: 3 | Status: SHIPPED | OUTPATIENT
Start: 2019-10-28 | End: 2019-11-21 | Stop reason: SDUPTHER

## 2019-11-21 RX ORDER — BUTALBITAL, ACETAMINOPHEN AND CAFFEINE 50; 325; 40 MG/1; MG/1; MG/1
TABLET ORAL
Qty: 30 TABLET | Refills: 3 | Status: SHIPPED | OUTPATIENT
Start: 2019-11-21 | End: 2019-12-11 | Stop reason: SDUPTHER

## 2019-12-13 RX ORDER — BUTALBITAL, ACETAMINOPHEN AND CAFFEINE 50; 325; 40 MG/1; MG/1; MG/1
1 TABLET ORAL EVERY 6 HOURS PRN
Qty: 30 TABLET | Refills: 3 | Status: SHIPPED | OUTPATIENT
Start: 2019-12-13 | End: 2020-02-03

## 2020-02-03 RX ORDER — BUTALBITAL, ACETAMINOPHEN AND CAFFEINE 50; 325; 40 MG/1; MG/1; MG/1
1 TABLET ORAL EVERY 6 HOURS PRN
Qty: 30 TABLET | Refills: 3 | Status: SHIPPED | OUTPATIENT
Start: 2020-02-03 | End: 2020-03-23

## 2020-03-23 RX ORDER — BUTALBITAL, ACETAMINOPHEN AND CAFFEINE 50; 325; 40 MG/1; MG/1; MG/1
1 TABLET ORAL EVERY 6 HOURS PRN
Qty: 30 TABLET | Refills: 3 | Status: SHIPPED | OUTPATIENT
Start: 2020-03-23 | End: 2020-04-29

## 2020-04-29 RX ORDER — BUTALBITAL, ACETAMINOPHEN AND CAFFEINE 50; 325; 40 MG/1; MG/1; MG/1
1 TABLET ORAL EVERY 6 HOURS PRN
Qty: 30 TABLET | Refills: 3 | Status: SHIPPED | OUTPATIENT
Start: 2020-04-29 | End: 2020-06-01

## 2020-06-01 RX ORDER — BUTALBITAL, ACETAMINOPHEN AND CAFFEINE 50; 325; 40 MG/1; MG/1; MG/1
1 TABLET ORAL EVERY 6 HOURS PRN
Qty: 30 TABLET | Refills: 3 | Status: SHIPPED | OUTPATIENT
Start: 2020-06-01 | End: 2020-07-15

## 2020-07-13 ENCOUNTER — OFFICE VISIT (OUTPATIENT)
Dept: FAMILY MEDICINE CLINIC | Age: 25
End: 2020-07-13
Payer: COMMERCIAL

## 2020-07-13 PROCEDURE — G8427 DOCREV CUR MEDS BY ELIG CLIN: HCPCS | Performed by: NURSE PRACTITIONER

## 2020-07-13 PROCEDURE — 99213 OFFICE O/P EST LOW 20 MIN: CPT | Performed by: NURSE PRACTITIONER

## 2020-07-13 PROCEDURE — G8419 CALC BMI OUT NRM PARAM NOF/U: HCPCS | Performed by: NURSE PRACTITIONER

## 2020-07-13 PROCEDURE — 96372 THER/PROPH/DIAG INJ SC/IM: CPT | Performed by: NURSE PRACTITIONER

## 2020-07-13 PROCEDURE — 1036F TOBACCO NON-USER: CPT | Performed by: NURSE PRACTITIONER

## 2020-07-13 RX ORDER — TRIAMCINOLONE ACETONIDE 40 MG/ML
40 INJECTION, SUSPENSION INTRA-ARTICULAR; INTRAMUSCULAR ONCE
Status: COMPLETED | OUTPATIENT
Start: 2020-07-13 | End: 2020-07-13

## 2020-07-13 RX ORDER — FLUTICASONE PROPIONATE 50 MCG
1 SPRAY, SUSPENSION (ML) NASAL DAILY
Qty: 1 BOTTLE | Refills: 0 | Status: ON HOLD | OUTPATIENT
Start: 2020-07-13 | End: 2020-11-03

## 2020-07-13 RX ADMIN — TRIAMCINOLONE ACETONIDE 40 MG: 40 INJECTION, SUSPENSION INTRA-ARTICULAR; INTRAMUSCULAR at 17:25

## 2020-07-13 ASSESSMENT — PATIENT HEALTH QUESTIONNAIRE - PHQ9
SUM OF ALL RESPONSES TO PHQ QUESTIONS 1-9: 0
2. FEELING DOWN, DEPRESSED OR HOPELESS: 0
1. LITTLE INTEREST OR PLEASURE IN DOING THINGS: 0
SUM OF ALL RESPONSES TO PHQ QUESTIONS 1-9: 0
SUM OF ALL RESPONSES TO PHQ9 QUESTIONS 1 & 2: 0

## 2020-07-13 NOTE — PROGRESS NOTES
Subjective  Merlinda Mariscal, 23 y.o. female presents today with:  Chief Complaint   Patient presents with    Pharyngitis     Patient states that she has had complaint of sore throat, cough, and sneezing complaint for one day. Admits to using over the counter Allergy medicine. Says she felt better after she took her medicine. HPI   Presents to [facility] for seasonal allergy exacerbation  Cough, sore throat & sneezing  Denies fever or chills  Allergies   No covid exposure or symptoms  Eating and drinking well      Past Medical History:   Diagnosis Date    Anemia     Anxiety     Depression       No past surgical history on file. Family History   Problem Relation Age of Onset    Diabetes Paternal Grandfather     Diabetes Father        Review of Systems   Constitutional: Negative for activity change, appetite change, chills, diaphoresis, fatigue and fever. HENT: Positive for postnasal drip, rhinorrhea and sore throat (dry, scratchy). Negative for congestion, ear pain, sinus pressure, sinus pain, sneezing and trouble swallowing. Eyes: Negative for redness and itching. Eye discharge: clear, watery. Respiratory: Positive for cough (dry). Negative for chest tightness and shortness of breath. Gastrointestinal: Negative for abdominal pain, diarrhea and nausea. Musculoskeletal: Negative for arthralgias and myalgias. Skin: Negative for pallor and rash. PMH, Surgical Hx, Family Hx, and Social Hx reviewed and updated.       Objective  Vitals:    07/13/20 1354   BP: 120/82   Site: Right Upper Arm   Position: Sitting   Cuff Size: Small Adult   Pulse: 76   Resp: 14   Temp: 97.4 °F (36.3 °C)   TempSrc: Temporal   SpO2: 98%   Weight: 142 lb (64.4 kg)     BP Readings from Last 3 Encounters:   07/13/20 120/82   10/11/19 112/68   08/15/19 (!) 121/59     Wt Readings from Last 3 Encounters:   07/13/20 142 lb (64.4 kg)   10/11/19 140 lb (63.5 kg)   08/13/19 155 lb 7 oz (70.5 kg)         Physical Exam  Vitals signs reviewed. Constitutional:       Appearance: Normal appearance. She is well-developed. She is not ill-appearing. HENT:      Head: Normocephalic. Right Ear: Hearing, tympanic membrane, ear canal and external ear normal.      Left Ear: Hearing, tympanic membrane, ear canal and external ear normal.      Nose: Mucosal edema present. Mouth/Throat:      Lips: Pink. Mouth: Mucous membranes are moist.      Pharynx: Oropharynx is clear. Uvula midline. Tonsils: No tonsillar exudate. 0 on the right. 0 on the left. Eyes:      General: Lids are normal.      Conjunctiva/sclera: Conjunctivae normal.   Neck:      Musculoskeletal: Normal range of motion. No neck rigidity or pain with movement. Cardiovascular:      Rate and Rhythm: Normal rate. Heart sounds: Normal heart sounds. Pulmonary:      Effort: Pulmonary effort is normal.      Breath sounds: Normal breath sounds. Abdominal:      Palpations: Abdomen is soft. Musculoskeletal: Normal range of motion. Lymphadenopathy:      Cervical: No cervical adenopathy. Skin:     General: Skin is warm and dry. Coloration: Skin is not pale. Findings: No rash. Neurological:      General: No focal deficit present. Mental Status: She is alert and oriented to person, place, and time. Psychiatric:         Mood and Affect: Mood normal.         Behavior: Behavior normal.       Assessment & Plan    Diagnosis Orders   1. Seasonal allergic rhinitis, unspecified trigger  triamcinolone acetonide (KENALOG-40) injection 40 mg    fluticasone (FLONASE) 50 MCG/ACT nasal spray   2. Cough     3. Sore throat       No orders of the defined types were placed in this encounter.     Orders Placed This Encounter   Medications    triamcinolone acetonide (KENALOG-40) injection 40 mg    fluticasone (FLONASE) 50 MCG/ACT nasal spray     Si spray by Nasal route daily     Dispense:  1 Bottle     Refill:  0     Return if symptoms worsen or fail to improve, for follow up with PCP. Reviewed with the patient: current clinical status & medications. Side effects, adverse effects of the medication administered/prescribed today, as well as treatment plan/rationale and result expectations have been discussed with the patient who expresses understanding. How can you care for yourself at home? · If you think that dust or dust mites are causing your allergies:  ? Wash sheets, pillowcases, and other bedding every week in hot water. ? Use airtight, dust-proof covers for pillows, duvets, and mattresses. Avoid plastic covers, because they tend to tear quickly and do not \"breathe. \" Wash according to the instructions. ? Remove extra blankets and pillows that you don't need. ? Use blankets that are machine-washable. ? Don't use home humidifiers. They can help mites live longer. · Use air-conditioning. Change or clean all filters every month. Keep windows closed. Use high-efficiency air filters. Don't use window or attic fans, which draw dust into the air. · If you're allergic to pet dander, keep pets outside or, at the very least, out of your bedroom. Old carpet and cloth-covered furniture can hold a lot of animal dander. You may need to replace them. · Look for signs of cockroaches. Use cockroach baits to get rid of them. Then clean your home well. · If you're allergic to mold, don't keep indoor plants, because molds can grow in soil. Get rid of furniture, rugs, and drapes that smell musty. Check for mold in the bathroom. · If you're allergic to pollen, stay inside when pollen counts are high. · Don't smoke or let anyone else smoke in your house. Don't use fireplaces or wood-burning stoves. Avoid paint fumes, perfumes, and other strong odors. Close follow up to evaluate treatment results and for coordination of care. I have reviewed the patient's medical history in detail and updated the computerized patient record.         Emilie Munroe

## 2020-07-15 RX ORDER — BUTALBITAL, ACETAMINOPHEN AND CAFFEINE 50; 325; 40 MG/1; MG/1; MG/1
1 TABLET ORAL EVERY 6 HOURS PRN
Qty: 30 TABLET | Refills: 3 | Status: SHIPPED | OUTPATIENT
Start: 2020-07-15 | End: 2020-08-13

## 2020-07-15 RX ORDER — ONDANSETRON HYDROCHLORIDE 8 MG/1
8 TABLET, FILM COATED ORAL EVERY 8 HOURS PRN
Qty: 21 TABLET | Refills: 3 | Status: SHIPPED | OUTPATIENT
Start: 2020-07-15 | End: 2020-11-11

## 2020-07-15 RX ORDER — METOCLOPRAMIDE 10 MG/1
10 TABLET ORAL 3 TIMES DAILY PRN
Qty: 120 TABLET | Refills: 3 | Status: SHIPPED | OUTPATIENT
Start: 2020-07-15 | End: 2020-11-11

## 2020-07-16 VITALS
HEART RATE: 76 BPM | DIASTOLIC BLOOD PRESSURE: 82 MMHG | BODY MASS INDEX: 25.15 KG/M2 | SYSTOLIC BLOOD PRESSURE: 120 MMHG | OXYGEN SATURATION: 98 % | RESPIRATION RATE: 14 BRPM | TEMPERATURE: 97.4 F | WEIGHT: 142 LBS

## 2020-07-16 ASSESSMENT — ENCOUNTER SYMPTOMS
EYE ITCHING: 0
DIARRHEA: 0
SORE THROAT: 1
SINUS PAIN: 0
CHEST TIGHTNESS: 0
COUGH: 1
SINUS PRESSURE: 0
SHORTNESS OF BREATH: 0
TROUBLE SWALLOWING: 0
EYE REDNESS: 0
RHINORRHEA: 1
NAUSEA: 0
ABDOMINAL PAIN: 0

## 2020-10-15 RX ORDER — BUTALBITAL, ACETAMINOPHEN AND CAFFEINE 50; 325; 40 MG/1; MG/1; MG/1
1 TABLET ORAL EVERY 6 HOURS PRN
Qty: 30 TABLET | Refills: 3 | Status: SHIPPED | OUTPATIENT
Start: 2020-10-15 | End: 2020-11-16

## 2020-11-03 ENCOUNTER — APPOINTMENT (OUTPATIENT)
Dept: MRI IMAGING | Age: 25
DRG: 102 | End: 2020-11-03
Payer: COMMERCIAL

## 2020-11-03 ENCOUNTER — APPOINTMENT (OUTPATIENT)
Dept: CT IMAGING | Age: 25
DRG: 102 | End: 2020-11-03
Payer: COMMERCIAL

## 2020-11-03 ENCOUNTER — APPOINTMENT (OUTPATIENT)
Dept: GENERAL RADIOLOGY | Age: 25
DRG: 102 | End: 2020-11-03
Payer: COMMERCIAL

## 2020-11-03 ENCOUNTER — HOSPITAL ENCOUNTER (INPATIENT)
Age: 25
LOS: 1 days | Discharge: HOME | DRG: 102 | End: 2020-11-05
Attending: STUDENT IN AN ORGANIZED HEALTH CARE EDUCATION/TRAINING PROGRAM | Admitting: INTERNAL MEDICINE
Payer: COMMERCIAL

## 2020-11-03 PROBLEM — R29.90 STROKE-LIKE EPISODE: Status: ACTIVE | Noted: 2020-11-03

## 2020-11-03 LAB
ALBUMIN SERPL-MCNC: 4.2 G/DL (ref 3.5–4.6)
ALP BLD-CCNC: 49 U/L (ref 40–130)
ALT SERPL-CCNC: 9 U/L (ref 0–33)
AMPHETAMINE SCREEN, URINE: NORMAL
ANION GAP SERPL CALCULATED.3IONS-SCNC: 9 MEQ/L (ref 9–15)
APTT: 32.6 SEC (ref 24.4–36.8)
AST SERPL-CCNC: 14 U/L (ref 0–35)
BARBITURATE SCREEN URINE: NORMAL
BASOPHILS ABSOLUTE: 0 K/UL (ref 0–0.2)
BASOPHILS RELATIVE PERCENT: 0.5 %
BENZODIAZEPINE SCREEN, URINE: NORMAL
BILIRUB SERPL-MCNC: 0.4 MG/DL (ref 0.2–0.7)
BILIRUBIN URINE: NEGATIVE
BLOOD, URINE: NEGATIVE
BUN BLDV-MCNC: 13 MG/DL (ref 6–20)
CALCIUM SERPL-MCNC: 8.4 MG/DL (ref 8.5–9.9)
CANNABINOID SCREEN URINE: NORMAL
CHLORIDE BLD-SCNC: 100 MEQ/L (ref 95–107)
CLARITY: CLEAR
CO2: 24 MEQ/L (ref 20–31)
COCAINE METABOLITE SCREEN URINE: NORMAL
COLOR: YELLOW
CREAT SERPL-MCNC: 0.76 MG/DL (ref 0.5–0.9)
EKG ATRIAL RATE: 73 BPM
EKG P AXIS: 30 DEGREES
EKG P-R INTERVAL: 160 MS
EKG Q-T INTERVAL: 368 MS
EKG QRS DURATION: 78 MS
EKG QTC CALCULATION (BAZETT): 405 MS
EKG R AXIS: 28 DEGREES
EKG T AXIS: 33 DEGREES
EKG VENTRICULAR RATE: 73 BPM
EOSINOPHILS ABSOLUTE: 0 K/UL (ref 0–0.7)
EOSINOPHILS RELATIVE PERCENT: 0.4 %
ETHANOL PERCENT: NORMAL G/DL
ETHANOL: <10 MG/DL (ref 0–0.08)
GFR AFRICAN AMERICAN: >60
GFR AFRICAN AMERICAN: >60
GFR NON-AFRICAN AMERICAN: >60
GFR NON-AFRICAN AMERICAN: >60
GLOBULIN: 2.9 G/DL (ref 2.3–3.5)
GLUCOSE BLD-MCNC: 87 MG/DL (ref 70–99)
GLUCOSE BLD-MCNC: 95 MG/DL (ref 60–115)
GLUCOSE URINE: NEGATIVE MG/DL
HCG, URINE, POC: NEGATIVE
HCT VFR BLD CALC: 36.7 % (ref 37–47)
HEMOGLOBIN: 12.3 G/DL (ref 12–16)
INR BLD: 1
KETONES, URINE: NEGATIVE MG/DL
LEUKOCYTE ESTERASE, URINE: NEGATIVE
LYMPHOCYTES ABSOLUTE: 2.4 K/UL (ref 1–4.8)
LYMPHOCYTES RELATIVE PERCENT: 28.8 %
Lab: NORMAL
Lab: NORMAL
MAGNESIUM: 2 MG/DL (ref 1.7–2.4)
MCH RBC QN AUTO: 28.8 PG (ref 27–31.3)
MCHC RBC AUTO-ENTMCNC: 33.4 % (ref 33–37)
MCV RBC AUTO: 86.2 FL (ref 82–100)
METHADONE SCREEN, URINE: NORMAL
MONOCYTES ABSOLUTE: 0.5 K/UL (ref 0.2–0.8)
MONOCYTES RELATIVE PERCENT: 6.1 %
NEGATIVE QC PASS/FAIL: NORMAL
NEUTROPHILS ABSOLUTE: 5.3 K/UL (ref 1.4–6.5)
NEUTROPHILS RELATIVE PERCENT: 64.2 %
NITRITE, URINE: NEGATIVE
OPIATE SCREEN URINE: NORMAL
OXYCODONE URINE: NORMAL
PDW BLD-RTO: 12.8 % (ref 11.5–14.5)
PERFORMED ON: NORMAL
PERFORMED ON: NORMAL
PH UA: 6.5 (ref 5–9)
PHENCYCLIDINE SCREEN URINE: NORMAL
PLATELET # BLD: 215 K/UL (ref 130–400)
POC CREATININE WHOLE BLOOD: 0.7
POC CREATININE: 0.7 MG/DL (ref 0.6–1.1)
POC SAMPLE TYPE: NORMAL
POSITIVE QC PASS/FAIL: NORMAL
POTASSIUM SERPL-SCNC: 3.8 MEQ/L (ref 3.4–4.9)
PROPOXYPHENE SCREEN: NORMAL
PROTEIN UA: NEGATIVE MG/DL
PROTHROMBIN TIME: 13.4 SEC (ref 12.3–14.9)
RBC # BLD: 4.25 M/UL (ref 4.2–5.4)
SODIUM BLD-SCNC: 133 MEQ/L (ref 135–144)
SPECIFIC GRAVITY UA: 1.07 (ref 1–1.03)
TOTAL PROTEIN: 7.1 G/DL (ref 6.3–8)
TROPONIN: <0.01 NG/ML (ref 0–0.01)
TSH SERPL DL<=0.05 MIU/L-ACNC: 2.73 UIU/ML (ref 0.44–3.86)
URINE REFLEX TO CULTURE: NORMAL
UROBILINOGEN, URINE: 0.2 E.U./DL
WBC # BLD: 8.3 K/UL (ref 4.8–10.8)

## 2020-11-03 PROCEDURE — 85730 THROMBOPLASTIN TIME PARTIAL: CPT

## 2020-11-03 PROCEDURE — 2580000003 HC RX 258: Performed by: INTERNAL MEDICINE

## 2020-11-03 PROCEDURE — 83735 ASSAY OF MAGNESIUM: CPT

## 2020-11-03 PROCEDURE — 86618 LYME DISEASE ANTIBODY: CPT

## 2020-11-03 PROCEDURE — 6360000004 HC RX CONTRAST MEDICATION: Performed by: PHYSICIAN ASSISTANT

## 2020-11-03 PROCEDURE — 85610 PROTHROMBIN TIME: CPT

## 2020-11-03 PROCEDURE — 6360000002 HC RX W HCPCS: Performed by: INTERNAL MEDICINE

## 2020-11-03 PROCEDURE — 80307 DRUG TEST PRSMV CHEM ANLYZR: CPT

## 2020-11-03 PROCEDURE — 81003 URINALYSIS AUTO W/O SCOPE: CPT

## 2020-11-03 PROCEDURE — 6370000000 HC RX 637 (ALT 250 FOR IP): Performed by: PHYSICIAN ASSISTANT

## 2020-11-03 PROCEDURE — 71045 X-RAY EXAM CHEST 1 VIEW: CPT

## 2020-11-03 PROCEDURE — 85025 COMPLETE CBC W/AUTO DIFF WBC: CPT

## 2020-11-03 PROCEDURE — 82607 VITAMIN B-12: CPT

## 2020-11-03 PROCEDURE — 99285 EMERGENCY DEPT VISIT HI MDM: CPT

## 2020-11-03 PROCEDURE — 82746 ASSAY OF FOLIC ACID SERUM: CPT

## 2020-11-03 PROCEDURE — 70498 CT ANGIOGRAPHY NECK: CPT

## 2020-11-03 PROCEDURE — G0378 HOSPITAL OBSERVATION PER HR: HCPCS

## 2020-11-03 PROCEDURE — 70496 CT ANGIOGRAPHY HEAD: CPT

## 2020-11-03 PROCEDURE — 80053 COMPREHEN METABOLIC PANEL: CPT

## 2020-11-03 PROCEDURE — 4A10X4Z MONITORING OF CENTRAL NERVOUS ELECTRICAL ACTIVITY, EXTERNAL APPROACH: ICD-10-PCS | Performed by: INTERNAL MEDICINE

## 2020-11-03 PROCEDURE — 84443 ASSAY THYROID STIM HORMONE: CPT

## 2020-11-03 PROCEDURE — 96372 THER/PROPH/DIAG INJ SC/IM: CPT

## 2020-11-03 PROCEDURE — 93005 ELECTROCARDIOGRAM TRACING: CPT | Performed by: PHYSICIAN ASSISTANT

## 2020-11-03 PROCEDURE — 6360000004 HC RX CONTRAST MEDICATION: Performed by: INTERNAL MEDICINE

## 2020-11-03 PROCEDURE — A9579 GAD-BASE MR CONTRAST NOS,1ML: HCPCS | Performed by: INTERNAL MEDICINE

## 2020-11-03 PROCEDURE — 36415 COLL VENOUS BLD VENIPUNCTURE: CPT

## 2020-11-03 PROCEDURE — G0480 DRUG TEST DEF 1-7 CLASSES: HCPCS

## 2020-11-03 PROCEDURE — 70553 MRI BRAIN STEM W/O & W/DYE: CPT

## 2020-11-03 PROCEDURE — 84484 ASSAY OF TROPONIN QUANT: CPT

## 2020-11-03 PROCEDURE — 70450 CT HEAD/BRAIN W/O DYE: CPT

## 2020-11-03 PROCEDURE — 86592 SYPHILIS TEST NON-TREP QUAL: CPT

## 2020-11-03 RX ORDER — ASPIRIN 81 MG/1
324 TABLET, CHEWABLE ORAL ONCE
Status: COMPLETED | OUTPATIENT
Start: 2020-11-03 | End: 2020-11-03

## 2020-11-03 RX ORDER — ASPIRIN 81 MG/1
81 TABLET ORAL DAILY
Status: DISCONTINUED | OUTPATIENT
Start: 2020-11-04 | End: 2020-11-05 | Stop reason: HOSPADM

## 2020-11-03 RX ORDER — SODIUM CHLORIDE 9 MG/ML
INJECTION, SOLUTION INTRAVENOUS CONTINUOUS
Status: DISPENSED | OUTPATIENT
Start: 2020-11-03 | End: 2020-11-04

## 2020-11-03 RX ORDER — KETOROLAC TROMETHAMINE 15 MG/ML
15 INJECTION, SOLUTION INTRAMUSCULAR; INTRAVENOUS EVERY 6 HOURS PRN
Status: DISCONTINUED | OUTPATIENT
Start: 2020-11-03 | End: 2020-11-05 | Stop reason: HOSPADM

## 2020-11-03 RX ORDER — ONDANSETRON 2 MG/ML
4 INJECTION INTRAMUSCULAR; INTRAVENOUS EVERY 6 HOURS PRN
Status: DISCONTINUED | OUTPATIENT
Start: 2020-11-03 | End: 2020-11-05 | Stop reason: HOSPADM

## 2020-11-03 RX ORDER — PROMETHAZINE HYDROCHLORIDE 12.5 MG/1
12.5 TABLET ORAL EVERY 6 HOURS PRN
Status: DISCONTINUED | OUTPATIENT
Start: 2020-11-03 | End: 2020-11-05 | Stop reason: HOSPADM

## 2020-11-03 RX ORDER — SODIUM CHLORIDE 0.9 % (FLUSH) 0.9 %
10 SYRINGE (ML) INJECTION 2 TIMES DAILY
Status: DISCONTINUED | OUTPATIENT
Start: 2020-11-03 | End: 2020-11-05 | Stop reason: HOSPADM

## 2020-11-03 RX ORDER — ASPIRIN 300 MG/1
300 SUPPOSITORY RECTAL DAILY
Status: DISCONTINUED | OUTPATIENT
Start: 2020-11-04 | End: 2020-11-05 | Stop reason: HOSPADM

## 2020-11-03 RX ORDER — SODIUM CHLORIDE 0.9 % (FLUSH) 0.9 %
10 SYRINGE (ML) INJECTION EVERY 12 HOURS SCHEDULED
Status: DISCONTINUED | OUTPATIENT
Start: 2020-11-03 | End: 2020-11-05 | Stop reason: HOSPADM

## 2020-11-03 RX ORDER — POLYETHYLENE GLYCOL 3350 17 G/17G
17 POWDER, FOR SOLUTION ORAL DAILY PRN
Status: DISCONTINUED | OUTPATIENT
Start: 2020-11-03 | End: 2020-11-05 | Stop reason: HOSPADM

## 2020-11-03 RX ORDER — SODIUM CHLORIDE 0.9 % (FLUSH) 0.9 %
10 SYRINGE (ML) INJECTION PRN
Status: DISCONTINUED | OUTPATIENT
Start: 2020-11-03 | End: 2020-11-05 | Stop reason: HOSPADM

## 2020-11-03 RX ADMIN — IOPAMIDOL 100 ML: 612 INJECTION, SOLUTION INTRAVENOUS at 15:18

## 2020-11-03 RX ADMIN — Medication 10 ML: at 22:02

## 2020-11-03 RX ADMIN — ASPIRIN 324 MG: 81 TABLET, CHEWABLE ORAL at 17:05

## 2020-11-03 RX ADMIN — ENOXAPARIN SODIUM 40 MG: 40 INJECTION SUBCUTANEOUS at 22:01

## 2020-11-03 RX ADMIN — GADOTERIDOL 15 ML: 279.3 INJECTION, SOLUTION INTRAVENOUS at 21:10

## 2020-11-03 RX ADMIN — SODIUM CHLORIDE: 9 INJECTION, SOLUTION INTRAVENOUS at 22:01

## 2020-11-03 ASSESSMENT — ENCOUNTER SYMPTOMS
VOMITING: 0
ABDOMINAL DISTENTION: 0
APNEA: 0
VOICE CHANGE: 0
ABDOMINAL PAIN: 0
PHOTOPHOBIA: 0
ANAL BLEEDING: 0
COUGH: 0
BACK PAIN: 0
SHORTNESS OF BREATH: 0
BLOOD IN STOOL: 0
NAUSEA: 0
EYE DISCHARGE: 0

## 2020-11-03 ASSESSMENT — PAIN SCALES - GENERAL: PAINLEVEL_OUTOF10: 0

## 2020-11-03 NOTE — CARE COORDINATION
[facility] Case Management Initial Discharge Assessment    Met with patient and significant other/zenaida to discuss discharge plan. PCP: Viviana Solo MD                                  Date of Last Visit: Last visit to PCP office in July 2020. If no PCP, list provided? N/A    Discharge Planning    Living Arrangements: - Patient lives in two-story home with bed/bath on first floor. Patient is independent with ADLs/IADLs. Who do you live with? Brittney Cuenca ([phone])    Who helps you with your care:  Brittney Cuenca can assist as can patient's mother Carmen Norman [phone])    If lives at home:  Do you have any barriers navigating in your home? No    Patient can perform ADL? Yes    Current Services (outpatient and in home) :  None    Dialysis: No    Is transportation available to get to your appointments? Yes    DME Equipment:  No    Respiratory equipment: None    Respiratory provider:  no     Pharmacy:  [facility] [place] [phone]    Consult with Medication Assistance Program?  No      Patient agreeable to [facility]? Yes, Company - No preference stated. Patient agreeable to SNF/Rehab? N/A    Other discharge needs identified? Other - To be determined following medical work-up. Freedom of choice list provided with basic dialogue that supports the patient's individualized plan of care/goals and shares the quality data associated with the providers. Yes    Does Patient Have a High-Risk for Readmission Diagnosis (CHF, PN, MI, COPD)? No    The plan for Transition of Care is related to the following treatment goals: Further eval of symptoms. Initial Discharge Plan? (Note: please see concurrent daily documentation for any updates after initial note). Home with [facility] if needed.     The Patient and/or patient representative: Patient was provided with notice of choice of any post-acute providers for care and equipment and agrees with discharge plan  Yes    Electronically signed by Princess Uriarte RN on 11/3/2020 at 5:07 PM

## 2020-11-03 NOTE — PROGRESS NOTES
Spiritual Care Services     Summary of Visit:   responded to ER bed 18 for a Code BAT. Patient's significant other was present in the room. SO said he and the patient would be fine and he did not require further spiritual support.  encouraged SO to contact spiritual care if needed. Spiritual Assessment/Intervention/Outcomes:    Encounter Summary  Services provided to[de-identified] Family  Support System: Significant other, Parent  Continue Visiting: No  Complexity of Encounter: High  Length of Encounter: 15 minutes     Crisis  Type: Code, Stroke Alert  Assessment: Calm, Approachable  Intervention: [place], Sustaining presence/ Ministry of presence  Outcome: Receptive                            Care Plan:        Spiritual Care Services   Electronically signed by Stoney Rollins on 11/3/20 at 4:00 PM EST     To reach a  for emotional and spiritual support, place an [facility] consult request.   If a  is needed immediately, dial 0 and ask to page the on-call .

## 2020-11-03 NOTE — ED PROVIDER NOTES
polyphagia. Genitourinary: Negative for dysuria, genital sores, hematuria and vaginal bleeding. Musculoskeletal: Negative for back pain, joint swelling, neck pain and neck stiffness. Skin: Negative for pallor. Allergic/Immunologic: Negative for immunocompromised state. Neurological: Positive for weakness and numbness. Negative for dizziness, tremors, seizures, syncope, facial asymmetry, speech difficulty and light-headedness. Hematological: Does not bruise/bleed easily. Psychiatric/Behavioral: Negative for behavioral problems, self-injury and sleep disturbance. All other systems reviewed and are negative. Except as noted above the remainder of the review of systems was reviewed and negative. PAST MEDICAL HISTORY     Past Medical History:   Diagnosis Date    Anemia     Anxiety     Depression          SURGICALHISTORY     History reviewed. No pertinent surgical history.       CURRENT MEDICATIONS       Current Discharge Medication List      CONTINUE these medications which have NOT CHANGED    Details   butalbital-acetaminophen-caffeine (FIORICET, ESGIC) -40 MG per tablet TAKE 1 TABLET BY MOUTH EVERY 6 HOURS AS NEEDED FOR HEADACHES  Qty: 30 tablet, Refills: 3      metoclopramide (REGLAN) 10 MG tablet Take 1 tablet by mouth 3 times daily as needed (nausea)  Qty: 120 tablet, Refills: 3      ondansetron (ZOFRAN) 8 MG tablet Take 1 tablet by mouth every 8 hours as needed for Nausea or Vomiting  Qty: 21 tablet, Refills: 3      fluticasone (FLONASE) 50 MCG/ACT nasal spray 1 spray by Nasal route daily  Qty: 1 Bottle, Refills: 0    Associated Diagnoses: Seasonal allergic rhinitis, unspecified trigger      ibuprofen (ADVIL;MOTRIN) 600 MG tablet TAKE 1 TABLET BY MOUTH 4 TIMES DAILY AS NEEDED FOR PAIN  Qty: 60 tablet, Refills: 0      Elastic Bandages & Supports (ABDOMINAL BINDER/ELASTIC MED) MISC 1 Device by Does not apply route daily as needed (abdominal support)  Qty: 1 each, Refills: 0 cephALEXin (KEFLEX) 500 MG capsule Take 1 capsule by mouth 2 times daily  Qty: 14 capsule, Refills: 0      Prenatal Multivit-Min-Fe-FA (PRENATAL 1 + IRON PO) Take by mouth      ferrous sulfate 325 (65 Fe) MG tablet Take 1 tablet by mouth 2 times daily  Qty: 180 tablet, Refills: 1             ALLERGIES     Patient has no known allergies. FAMILY HISTORY       Family History   Problem Relation Age of Onset    Diabetes Paternal Grandfather     Diabetes Father           SOCIAL HISTORY       Social History     Socioeconomic History    Marital status: Single     Spouse name: None    Number of children: None    Years of education: None    Highest education level: None   Occupational History    None   Social Needs    Financial resource strain: None    Food insecurity     Worry: None     Inability: None    Transportation needs     Medical: None     Non-medical: None   Tobacco Use    Smoking status: Former Smoker    Smokeless tobacco: Never Used   Substance and Sexual Activity    Alcohol use: Yes     Comment: occas    Drug use: No    Sexual activity: Yes     Partners: Male   Lifestyle    Physical activity     Days per week: None     Minutes per session: None    Stress: None   Relationships    Social connections     Talks on phone: None     Gets together: None     Attends Voodoo service: None     Active member of club or organization: None     Attends meetings of clubs or organizations: None     Relationship status: None    Intimate partner violence     Fear of current or ex partner: None     Emotionally abused: None     Physically abused: None     Forced sexual activity: None   Other Topics Concern    None   Social History Narrative    None       SCREENINGS   NIH Stroke Scale  NIH Stroke Scale Assessed: Yes  Interval: Baseline  Level of Consciousness (1a. ): Alert  LOC Questions (1b. ):  Answers both correctly  LOC Commands (1c. ): Performs both tasks correctly  Best Gaze (2. ): Normal  Visual (3. ): No visual loss  Facial Palsy (4. ): Normal symmetrical movement  Motor Arm, Left (5a. ): No drift  Motor Arm, Right (5b. ): No drift  Motor Leg, Left (6a. ): No drift  Motor Leg, Right (6b. ): No drift  Limb Ataxia (7. ): Absent  Sensory (8. ): (!) Mild to Moderate  Best Language (9. ): No aphasia  Dysarthria (10. ): Normal  Extinction and Inattention (11): No abnormality  Total: 1Glasgow Coma Scale  Eye Opening: Spontaneous  Best Verbal Response: Oriented  Best Motor Response: Obeys commands  [place] Coma Scale Score: 15 @FLOW([id number])@      PHYSICAL EXAM    (up to 7 for level 4, 8 or more for level 5)     ED Triage Vitals [11/03/20 1443]   BP Temp Temp Source Pulse Resp SpO2 Height Weight   129/88 98.8 °F (37.1 °C) Temporal 92 18 96 % 5' 3\" (1.6 m) 140 lb (63.5 kg)       Physical Exam  Vitals signs and nursing note reviewed. Constitutional:       General: She is not in acute distress. Appearance: She is well-developed. HENT:      Head: Normocephalic and atraumatic. Right Ear: There is no impacted cerumen. Left Ear: There is no impacted cerumen. Nose: Nose normal.      Mouth/Throat:      Mouth: Mucous membranes are moist.      Pharynx: No oropharyngeal exudate or posterior oropharyngeal erythema. Eyes:      General:         Right eye: No discharge. Left eye: No discharge. Extraocular Movements: Extraocular movements intact. Pupils: Pupils are equal, round, and reactive to light. Neck:      Musculoskeletal: Normal range of motion and neck supple. Cardiovascular:      Rate and Rhythm: Normal rate and regular rhythm. Pulses: Normal pulses. Heart sounds: Normal heart sounds. Pulmonary:      Effort: Pulmonary effort is normal. No respiratory distress. Breath sounds: Normal breath sounds. No stridor. Abdominal:      General: Bowel sounds are normal. There is no distension. Palpations: Abdomen is soft. Tenderness:  There is no abdominal tenderness. Musculoskeletal: Normal range of motion. Right lower leg: No edema. Left lower leg: No edema. Skin:     General: Skin is warm. Findings: No erythema. Neurological:      Mental Status: She is alert and oriented to person, place, and time. Cranial Nerves: Cranial nerve deficit present. Sensory: Sensory deficit present. Motor: Weakness present. Coordination: Coordination normal.   Psychiatric:         Mood and Affect: Mood normal.         DIAGNOSTIC RESULTS     EKG: All EKG's are interpreted by the Emergency Department Physician who either signs or Co-signsthis chart in the absence of a cardiologist.    Normal sinus rate 73 neg ST segment ovation. RADIOLOGY:   Non-plain filmimages such as CT, Ultrasound and MRI are read by the radiologist. Plain radiographic images are visualized and preliminarily interpreted by the emergency physician with the below findings:     see rad report    Interpretation per the Radiologist below, if available at the time ofthis note:    XR CHEST PORTABLE   Final Result      No radiographic evidence of acute intrathoracic process. CT HEAD WO CONTRAST   Final Result      CTA HEAD W WO CONTRAST   Final Result   CTA HEAD AND CTA NECK NO LARGE VESSEL OCCLUSION OR SIGNIFICANT STENOSIS. NO SIGNS OF DISSECTION, VASCULAR MALFORMATION, OR EVIDENCE OF DURAL VENOUS THROMBOSIS. EXAMINATION: CTA NECK W WO CONTRAST, CTA HEAD W WO CONTRAST CTA HEAD      DATE AND TIME:11/3/2020 3:11 PM      CLINICAL HISTORY: Stroke  rt sided weakness and paresthesia        COMPARISON: None      TECHNIQUE:Helical CTA of the head and neck was performed from the vertex to the aortic arch following the uneventful intravenous administration of 100 cc of nonionic contrast without incident. 2-D images were reconstructed in the sagittal and coronal    planes. Three Dimensional Maximum Intensity Projection (3D-MIP) images were created.   All images were reviewed significant stenosis. Patent left posterior communicating artery. Basilar artery:No significant stenosis. Aneurysm No aneurysm or dissection in the anterior or posterior circulations. .          Neurocranium Negative           Dural sinus: Dural venous sinuses are unremarkable. EXAMINATION: CTA NECK W WO CONTRAST, CTA HEAD W WO CONTRAST. CTA NECK      DATE AND TIME:11/3/2020 3:11 PM      CLINICAL HISTORY: Stroke symptoms   rt sided weakness and paresthesia        TECHNIQUE: TECHNIQUE:Helical CTA of the head and neck was performed from the vertex to the aortic arch following the uneventful intravenous administration of 100 cc of nonionic contrast without incident. 2-D images were reconstructed in the sagittal and    coronal planes. Three Dimensional Maximum Intensity Projection (3D-MIP) images were created. All images were reviewed and primarily archived to PACS workstation. All CT scans at this facility use dose modulation, iterative reconstruction, and/or weight    based dosing when appropriate to reduce radiation dose to as low as reasonably achievable. NASCET Criteria were utilized      Result / Findings:      CTA NECK:      Aortic Arch: No stenosis aneurysm or dissection. Carotid:      Right  Carotid Stenosis (% by NASCET Criteria):  Less than 50%               Left  Carotid Stenosis (% by NASCET Criteria):  Less than 50%              Cervical Vertebral Arteries:         Patency: No stenosis aneurysm or dissection. Vertebral arteries are codominant.             MRI brain with and without contrast    (Results Pending)         ED BEDSIDE ULTRASOUND:   Performed by ED Physician - none    LABS:  Labs Reviewed   COMPREHENSIVE METABOLIC PANEL - Abnormal; Notable for the following components:       Result Value    Sodium 133 (*)     Calcium 8.4 (*)     All other components within normal limits   CBC WITH AUTO DIFFERENTIAL - Abnormal; Notable for the following components: Hematocrit 36.7 (*)     All other components within normal limits   POCT CREATININE - URINE - Normal   PROTIME-INR   APTT   MAGNESIUM   TROPONIN   ETHANOL   TSH WITHOUT REFLEX   URINE RT REFLEX TO CULTURE   URINE DRUG SCREEN   RPR   LYME DISEASE ACUTE REFLEXIVE PANEL   VITAMIN B12 & FOLATE   HEMOGLOBIN A1C   LIPID PANEL   CBC   BASIC METABOLIC PANEL W/ REFLEX TO MG FOR LOW K   POC PREGNANCY UR-QUAL   POCT GLUCOSE   POCT VENOUS       All other labs were within normal range or not returned as of this dictation. EMERGENCY DEPARTMENT COURSE and DIFFERENTIAL DIAGNOSIS/MDM:   Vitals:    Vitals:    11/03/20 1443 11/03/20 1545 11/03/20 1651 11/03/20 1751   BP: 129/88 115/69 108/77 (!) 98/58   Pulse: 92 82 83 80   Resp: 18 19 22 15   Temp: 98.8 °F (37.1 °C)      TempSrc: Temporal      SpO2: 96% 96% 97% 97%   Weight: 140 lb (63.5 kg)      Height: 5' 3\" (1.6 m)               MDM  Number of Diagnoses or Management Options  Diagnosis management comments: NIH stroke scale performed. Dr. Spike Figueroa evaluated patient. Fiancé noted that patient had headache this morning has history of headaches. On reexam patient's upper and lower extremity drift has improved. she continues to have right upper right lower extremity paresthesia as noted by Dr. Spike Figueroa as well prior. Discussed with Dr. Zay Nolascored not indicated as patient symptoms are improving. Patient aspirin and meds appropriately on reexam patient still has negative facial droop. Patient upon further questioning does have history of headaches and migraines gets Imitrex from her primary care physician. Amount and/or Complexity of Data Reviewed  Clinical lab tests: ordered  Tests in the radiology section of CPT®: ordered  Discuss the patient with other providers: yes        CRITICAL CARE TIME     CONSULTS:  IP CONSULT TO NEUROLOGY    PROCEDURES:  Unless otherwise noted below, none     Procedures    FINAL IMPRESSION      1. General weakness    2. Paresthesia    3.  Stroke-like episode          DISPOSITION/PLAN   DISPOSITION Admitted 11/03/2020 05:10:51 PM      PATIENT REFERRED TO:  No follow-up provider specified.     DISCHARGE MEDICATIONS:  Current Discharge Medication List             (Please note that portions of this note were completed with a voice recognition program.  Efforts were made to edit the dictations but occasionally words are mis-transcribed.)    Babar Shoemaker PA-C (electronically signed)  Attending Emergency Physician       Babar Shoemaker PA-C  11/03/20 150 Lois Mullins PA-C  11/03/20 2138       Babar Shoemaker PA-C  11/03/20 2142

## 2020-11-03 NOTE — ED NOTES
Pt resting quietly in ED cot w/no s/s of distress noted. Resp even and unlabored. Pt's fiance at bedside. Will continue to monitor pt.       May Handy RN  11/03/20 7729

## 2020-11-03 NOTE — ED TRIAGE NOTES
Pt presents to ED from home with c/o right-sided weakness/numbness. Pt states that at approximately 1300 she developed right-sided weakness, facial droop, and slurred speech. Pt denies hx of CVA/TIA. Pt does not take blood thinners. Pt ambulatory w/steady gait noted while in ED 18. Pt able to move all extremities on command, however weakness noted to right-side. No facial droop noted on assessment. Per pt, her speech remains slurred. Upon assessment, pt is A/Ox4, skin p/w/d, resp even and unlabored, msp's intact. Pt denies cp, sob, n/v/d, fever, or chills.

## 2020-11-03 NOTE — H&P
Hospital Medicine  History and Physical    Patient:  Eber Muir  MRN: [id number]    CHIEF COMPLAINT:    Chief Complaint   Patient presents with    Other     numbness to right side of face and rue       History Obtained From:  Patient, EMR  Primary Care Physician: Rashida Feliciano MD    HISTORY OF PRESENT ILLNESS:   57-year-old female presents from home after experiencing right-sided numbness that started at 1:40 PM today. She reports feeling tired when she woke up this morning but otherwise denies anything out of the ordinary. She then reports numbness on the right side of her face, her right hand up until her elbow, and her right foot up until her knee. She is not experiencing any numbness proximal to her elbow, or proximal to her knee. She does feel she is slightly weaker on her right side but is still able to walk and grab objects without complication. She denies any history of stroke, blood clot. She denies any stressors recently. She also denies recent illness, fevers, chest pain, dyspnea, abdominal pain, vomiting, change in bowels. She will occasionally smoke cigarettes but does not smoke regularly. She drinks alcohol only socially. She denies any illicit drug use. Her maternal grandmother had stroke in her 62s but otherwise there is no history of stroke, blood clots, or miscarriages. Past Medical History:      Diagnosis Date    Anemia     Anxiety     Depression        Past Surgical History:  History reviewed. No pertinent surgical history. Medications Prior to Admission:    Prior to Admission medications    Medication Sig Start Date End Date Taking?  Authorizing Provider   butalbital-acetaminophen-caffeine (FIORICET, ESGIC) -40 MG per tablet TAKE 1 TABLET BY MOUTH EVERY 6 HOURS AS NEEDED FOR HEADACHES 10/15/20   Kisha Kothari, DO   metoclopramide (REGLAN) 10 MG tablet Take 1 tablet by mouth 3 times daily as needed (nausea) 7/15/20   Kisha Kothari, DO   ondansetron [facility] [address] weakness COMPARISON:  NONE AVAILABLE TECHNIQUE: Multiple unenhanced serial axial images of the brain from the vertex of the skull to the base of the skull were performed. FINDINGS:  The ventricles are of normal size and configuration. No mass or midline shift. The cisterns are unremarkable. No acute intra-axial or extra-axial findings. The visualized osseous structures are unremarkable. The visualized paranasal sinuses and mastoids are unremarkable. IMPRESSION NO ACUTE INTRA-AXIAL OR EXTRA AXIAL FINDINGS. IF SIGNS OR SYMPTOMS PERSIST THEN CONSIDER MRI TO FURTHER EVALUATE IF THERE ARE NO CONTRAINDICATIONS DR. Lloyd Fiore EMERGENCY WAS NOTIFIED IMMEDIATELY OF THE ABOVE FINDINGS ON NOVEMBER 3, 2020 1517 HOURS All CT scans at this facility use dose modulation, iterative reconstruction, and/or weight based dosing when appropriate to reduce radiation dose to as low as reasonably achievable. Cta Neck W Wo Contrast    CTA HEAD AND CTA NECK NO LARGE VESSEL OCCLUSION OR SIGNIFICANT STENOSIS. NO SIGNS OF DISSECTION, VASCULAR MALFORMATION, OR EVIDENCE OF DURAL VENOUS THROMBOSIS. EXAMINATION: CTA NECK W WO CONTRAST, CTA HEAD W WO CONTRAST CTA HEAD DATE AND TIME:11/3/2020 3:11 PM CLINICAL HISTORY: Stroke  rt sided weakness and paresthesia  COMPARISON: None TECHNIQUE:Helical CTA of the head and neck was performed from the vertex to the aortic arch following the uneventful intravenous administration of 100 cc of nonionic contrast without incident. 2-D images were reconstructed in the sagittal and coronal planes. Three Dimensional Maximum Intensity Projection (3D-MIP) images were created. All images were reviewed and primarily archived to PACS workstation. All CT scans at this facility use dose modulation, iterative reconstruction, and/or weight based dosing when appropriate to reduce radiation dose to as low as reasonably achievable.  NASCET Criteria were utilized FINDINGS CTA Head     Intracranial ICAs: Normal flow in precavernous, cavernous, clinoid and supraclinoid segments of the internal carotid arteries bilaterally     Anterior cerebral arteries: There is a slightly hypoplastic right A1 segment with dominant left A1 segment. The distal anterior cerebral artery segments are patent. Middle cerebral arteries:No large vessel occlusion or significant stenosis. Bilateral MCA segments are well perfused     Posterior Circulation: No large vessel occlusion or significant stenosis. Patent left posterior communicating artery. Basilar artery:No significant stenosis. Aneurysm No aneurysm or dissection in the anterior or posterior circulations. .     Neurocranium Negative     Dural sinus: Dural venous sinuses are unremarkable. EXAMINATION: CTA NECK W WO CONTRAST, CTA HEAD W WO CONTRAST. CTA NECK DATE AND TIME:11/3/2020 3:11 PM CLINICAL HISTORY: Stroke symptoms   rt sided weakness and paresthesia  TECHNIQUE: TECHNIQUE:Helical CTA of the head and neck was performed from the vertex to the aortic arch following the uneventful intravenous administration of 100 cc of nonionic contrast without incident. 2-D images were reconstructed in the sagittal and  coronal planes. Three Dimensional Maximum Intensity Projection (3D-MIP) images were created. All images were reviewed and primarily archived to PACS workstation. All CT scans at this facility use dose modulation, iterative reconstruction, and/or weight  based dosing when appropriate to reduce radiation dose to as low as reasonably achievable. NASCET Criteria were utilized Result / Findings: CTA NECK: Aortic Arch: No stenosis aneurysm or dissection. Carotid:   Right  Carotid Stenosis (% by NASCET Criteria):  Less than 50%         Left  Carotid Stenosis (% by NASCET Criteria):  Less than 50%     Cervical Vertebral Arteries:    Patency: No stenosis aneurysm or dissection. Vertebral arteries are codominant.      Xr Chest Portable    Result Date: 11/3/2020  Exam: XR CHEST PORTABLE History: Weakness Technique: AP portable view of the chest obtained. Comparison: None available Findings: The cardiomediastinal silhouette is within normal limits. No pneumothorax, pleural effusion, or consolidation. Bones of the thorax appear intact. No radiographic evidence of acute intrathoracic process. Assessment and Plan     1.   Strokelike episode: Right-sided paresthesia  -MRI head with and without contrast to rule out stroke and structural lesion  -Baby aspirin for now  -Neurology consulted  -Monitor on telemetry, continue neurochecks    Lovenox prophylaxis  Full code    Observation    David Elizondo, [address]

## 2020-11-03 NOTE — ED NOTES
Pt resting quietly in ED cot w/no s/s of distress noted. Resp even and unlabored. Pt's fiance remains at bedside. Will continue to monitor pt.       Jay Galvan RN  11/03/20 0325

## 2020-11-03 NOTE — ED NOTES
Pt resting quietly in ED cot w/no s/s of distress noted. Resp even and unlabored. Will continue to monitor pt.       Kaylee Morales RN  11/03/20 7983

## 2020-11-04 LAB
ANION GAP SERPL CALCULATED.3IONS-SCNC: 9 MEQ/L (ref 9–15)
BUN BLDV-MCNC: 17 MG/DL (ref 6–20)
CALCIUM SERPL-MCNC: 8.3 MG/DL (ref 8.5–9.9)
CHLORIDE BLD-SCNC: 108 MEQ/L (ref 95–107)
CHOLESTEROL, TOTAL: 124 MG/DL (ref 0–199)
CO2: 24 MEQ/L (ref 20–31)
CREAT SERPL-MCNC: 0.69 MG/DL (ref 0.5–0.9)
FOLATE: 6.1 NG/ML (ref 7.3–26.1)
GFR AFRICAN AMERICAN: >60
GFR NON-AFRICAN AMERICAN: >60
GLUCOSE BLD-MCNC: 87 MG/DL (ref 70–99)
HBA1C MFR BLD: 4.9 % (ref 4.8–5.9)
HCT VFR BLD CALC: 35 % (ref 37–47)
HDLC SERPL-MCNC: 50 MG/DL (ref 40–59)
HEMOGLOBIN: 11.7 G/DL (ref 12–16)
HOMOCYSTEINE: 7.9 UMOL/L (ref 0–15)
LDL CHOLESTEROL CALCULATED: 62 MG/DL (ref 0–129)
LV EF: 36 %
LVEF MODALITY: NORMAL
MCH RBC QN AUTO: 29.5 PG (ref 27–31.3)
MCHC RBC AUTO-ENTMCNC: 33.5 % (ref 33–37)
MCV RBC AUTO: 88.1 FL (ref 82–100)
PDW BLD-RTO: 13 % (ref 11.5–14.5)
PLATELET # BLD: 200 K/UL (ref 130–400)
POTASSIUM REFLEX MAGNESIUM: 4.2 MEQ/L (ref 3.4–4.9)
RBC # BLD: 3.97 M/UL (ref 4.2–5.4)
RPR: NORMAL
SODIUM BLD-SCNC: 141 MEQ/L (ref 135–144)
TRIGL SERPL-MCNC: 61 MG/DL (ref 0–150)
VITAMIN B-12: 341 PG/ML (ref 232–1245)
WBC # BLD: 7.3 K/UL (ref 4.8–10.8)

## 2020-11-04 PROCEDURE — 85300 ANTITHROMBIN III ACTIVITY: CPT

## 2020-11-04 PROCEDURE — 99243 OFF/OP CNSLTJ NEW/EST LOW 30: CPT | Performed by: INTERNAL MEDICINE

## 2020-11-04 PROCEDURE — 86225 DNA ANTIBODY NATIVE: CPT

## 2020-11-04 PROCEDURE — APPSS45 APP SPLIT SHARED TIME 31-45 MINUTES: Performed by: NURSE PRACTITIONER

## 2020-11-04 PROCEDURE — 99254 IP/OBS CNSLTJ NEW/EST MOD 60: CPT | Performed by: PSYCHIATRY & NEUROLOGY

## 2020-11-04 PROCEDURE — 2580000003 HC RX 258: Performed by: INTERNAL MEDICINE

## 2020-11-04 PROCEDURE — 83516 IMMUNOASSAY NONANTIBODY: CPT

## 2020-11-04 PROCEDURE — 85613 RUSSELL VIPER VENOM DILUTED: CPT

## 2020-11-04 PROCEDURE — 85730 THROMBOPLASTIN TIME PARTIAL: CPT

## 2020-11-04 PROCEDURE — 6360000002 HC RX W HCPCS: Performed by: INTERNAL MEDICINE

## 2020-11-04 PROCEDURE — 85305 CLOT INHIBIT PROT S TOTAL: CPT

## 2020-11-04 PROCEDURE — 96372 THER/PROPH/DIAG INJ SC/IM: CPT

## 2020-11-04 PROCEDURE — G0378 HOSPITAL OBSERVATION PER HR: HCPCS

## 2020-11-04 PROCEDURE — 80048 BASIC METABOLIC PNL TOTAL CA: CPT

## 2020-11-04 PROCEDURE — 97165 OT EVAL LOW COMPLEX 30 MIN: CPT

## 2020-11-04 PROCEDURE — 85027 COMPLETE CBC AUTOMATED: CPT

## 2020-11-04 PROCEDURE — 85610 PROTHROMBIN TIME: CPT

## 2020-11-04 PROCEDURE — 81241 F5 GENE: CPT

## 2020-11-04 PROCEDURE — 93306 TTE W/DOPPLER COMPLETE: CPT

## 2020-11-04 PROCEDURE — 95816 EEG AWAKE AND DROWSY: CPT

## 2020-11-04 PROCEDURE — 6370000000 HC RX 637 (ALT 250 FOR IP): Performed by: INTERNAL MEDICINE

## 2020-11-04 PROCEDURE — 97161 PT EVAL LOW COMPLEX 20 MIN: CPT

## 2020-11-04 PROCEDURE — 83036 HEMOGLOBIN GLYCOSYLATED A1C: CPT

## 2020-11-04 PROCEDURE — 85302 CLOT INHIBIT PROT C ANTIGEN: CPT

## 2020-11-04 PROCEDURE — 80061 LIPID PANEL: CPT

## 2020-11-04 PROCEDURE — 83090 ASSAY OF HOMOCYSTEINE: CPT

## 2020-11-04 PROCEDURE — 93010 ELECTROCARDIOGRAM REPORT: CPT | Performed by: INTERNAL MEDICINE

## 2020-11-04 PROCEDURE — 36415 COLL VENOUS BLD VENIPUNCTURE: CPT

## 2020-11-04 RX ORDER — SODIUM CHLORIDE 0.9 % (FLUSH) 0.9 %
10 SYRINGE (ML) INJECTION EVERY 12 HOURS SCHEDULED
Status: CANCELLED | OUTPATIENT
Start: 2020-11-04

## 2020-11-04 RX ORDER — SUMATRIPTAN 100 MG/1
50 TABLET, FILM COATED ORAL ONCE
Status: COMPLETED | OUTPATIENT
Start: 2020-11-04 | End: 2020-11-04

## 2020-11-04 RX ORDER — SODIUM CHLORIDE 0.9 % (FLUSH) 0.9 %
10 SYRINGE (ML) INJECTION PRN
Status: CANCELLED | OUTPATIENT
Start: 2020-11-04

## 2020-11-04 RX ORDER — SODIUM CHLORIDE 9 MG/ML
INJECTION, SOLUTION INTRAVENOUS CONTINUOUS
Status: CANCELLED | OUTPATIENT
Start: 2020-11-04

## 2020-11-04 RX ADMIN — Medication 10 ML: at 08:53

## 2020-11-04 RX ADMIN — SUMATRIPTAN SUCCINATE 50 MG: 100 TABLET, FILM COATED ORAL at 14:12

## 2020-11-04 RX ADMIN — ENOXAPARIN SODIUM 40 MG: 40 INJECTION SUBCUTANEOUS at 08:52

## 2020-11-04 RX ADMIN — Medication 10 ML: at 08:54

## 2020-11-04 RX ADMIN — ASPIRIN 81 MG: 81 TABLET, COATED ORAL at 08:52

## 2020-11-04 RX ADMIN — Medication 10 ML: at 20:31

## 2020-11-04 ASSESSMENT — ENCOUNTER SYMPTOMS
NAUSEA: 0
WHEEZING: 0
RESPIRATORY NEGATIVE: 1
BLOOD IN STOOL: 0
STRIDOR: 0
CHEST TIGHTNESS: 0
COLOR CHANGE: 0
VOMITING: 0
SHORTNESS OF BREATH: 0
EYES NEGATIVE: 1
TROUBLE SWALLOWING: 0
COUGH: 0
GASTROINTESTINAL NEGATIVE: 1

## 2020-11-04 ASSESSMENT — PAIN SCALES - GENERAL
PAINLEVEL_OUTOF10: 0
PAINLEVEL_OUTOF10: 6

## 2020-11-04 NOTE — PROGRESS NOTES
oriented to person, place and time and normal affect  Lungs: clear to auscultation bilaterally, normal effort  Heart: regular rate and rhythm, no murmur  Abdomen: soft, nontender, nondistended, bowel sounds present, no masses  Extremities: no edema, redness, tenderness in the calves  Skin: no gross lesions, rashes    Data:     Labs:  Recent Labs     11/03/20  1500 11/04/20  0520   WBC 8.3 7.3   HGB 12.3 11.7*    200     Recent Labs     11/03/20  1500 11/03/20  1514 11/04/20  0520   *  --  141   K 3.8  --  4.2     --  108*   CO2 24  --  24   BUN 13  --  17   CREATININE 0.76 0.7 0.69   GLUCOSE 87  --  87     Recent Labs     11/03/20  1500   AST 14   ALT 9   BILITOT 0.4   ALKPHOS 49       Assessment and Plan:        1. Strokelike episode: Right-sided paresthesia- improved but still present in R face and distal to R knee  -MRI head still has not been read. R/o CVA, structural lesion, MS  -continue Baby aspirin   -added folic acid  -Neurology obtain EEG, HERMAN for PFO.  Considering diagnosis of complex migraine however patient without any migraine on presentation  -Monitor on telemetry, continue neurochecks    PFO     Lovenox prophylaxis  Full code     Observation      Electronically signed by Jessica Shah DO on 11/4/2020 at 4:00 PM

## 2020-11-04 NOTE — CONSULTS
[id number] [facility] Neurology Consult Note  Name: Kristen Montelongo  Age: 23 y.o. Gender: female  CodeStatus: Full Code  Allergies: No Known Allergies    Chief Complaint:Other (numbness to right side of face and rue)    Primary Care Provider: John Broussard MD  InpatientTreatment Team: Treatment Team: Attending Provider: Saba Adams DO; Consulting Physician: Sami Noble MD; : Wenceslao Menchaca RN; Utilization Reviewer: Mariluz Leo RN; Respiratory Therapist (Day): Coleen John RCP; Patient Care Tech: Hafsa Bai; Registered Nurse: Augie Ahn RN  Admission Date: 11/3/2020      HPI   Pt seen and examined for neurology consult. Patient is a 22-year-old  female with past medical history of depression, anxiety, anemia who presented to [facility] [place] emergency room on 11/3/2020 with reports of numbness to the right side of the face and right upper extremity with right hand weakness. Patient currently alert and oriented x3, no acute distress, cooperative. She does report that she was driving her car yesterday when she had a sudden onset of numbness to the right side of her face. She reports that she looked in the rearview mirror and noticed that she had a droop to the right side of her mouth. She reports that she Then developed paresthesias to her right arm and weakness of her right hand. Subsequently later that evening she developed paresthesia to the right leg. She denies any recent or remote head trauma, history of seizures or stroke, recent fever or illness, new medications. She denies alcohol or illicit drug use. Denies any history of chest pain or pressure or palpitations. Patient does report that she has history of anxiety and panic attacks. She denies anxiety or panic at the time of the symptoms. She also has history of migraine headaches with associated visual aura and photophobia. She denies any headache or migraine symptoms at the time of symptom onset.   Patient connections     Talks on phone: Not on file     Gets together: Not on file     Attends Spiritism service: Not on file     Active member of club or organization: Not on file     Attends meetings of clubs or organizations: Not on file     Relationship status: Not on file    Intimate partner violence     Fear of current or ex partner: Not on file     Emotionally abused: Not on file     Physically abused: Not on file     Forced sexual activity: Not on file   Other Topics Concern    Not on file   Social History Narrative    Not on file        Review of Systems   Constitutional: Negative for appetite change, chills, fatigue and fever. HENT: Negative for hearing loss and trouble swallowing. Eyes: Negative for visual disturbance. Respiratory: Negative for cough, chest tightness, shortness of breath and wheezing. Cardiovascular: Negative for chest pain, palpitations and leg swelling. Gastrointestinal: Negative for nausea and vomiting. Genitourinary: Negative for difficulty urinating. Musculoskeletal: Negative for gait problem. Skin: Negative for color change and rash. Neurological: Positive for numbness. Negative for dizziness, tremors, seizures, syncope, facial asymmetry, speech difficulty, weakness, light-headedness and headaches. Psychiatric/Behavioral: Negative for agitation, confusion and hallucinations. The patient is not nervous/anxious. Physical Exam  Vitals signs and nursing note reviewed. Constitutional:       General: She is not in acute distress. Appearance: She is not ill-appearing or diaphoretic. HENT:      Head: Normocephalic and atraumatic. Eyes:      General: No visual field deficit. Extraocular Movements: Extraocular movements intact. Pupils: Pupils are equal, round, and reactive to light. Cardiovascular:      Rate and Rhythm: Normal rate and regular rhythm. Pulmonary:      Effort: Pulmonary effort is normal. No respiratory distress.       Breath sounds: Normal breath sounds. Abdominal:      General: Bowel sounds are normal. There is no distension. Palpations: Abdomen is soft. Tenderness: There is no abdominal tenderness. Skin:     General: Skin is warm and dry. Neurological:      General: No focal deficit present. Mental Status: She is alert and oriented to person, place, and time. Cranial Nerves: No cranial nerve deficit, dysarthria or facial asymmetry. Motor: No weakness, tremor, atrophy, abnormal muscle tone, seizure activity or pronator drift. Coordination: Romberg sign negative. Finger-Nose-Finger Test normal.               Medications:  Reviewed    Infusion Medications:   Scheduled Medications:    SUMAtriptan  50 mg Oral Once    sodium chloride flush  10 mL Intravenous 2 times per day    enoxaparin  40 mg Subcutaneous Daily    aspirin  81 mg Oral Daily    Or    aspirin  300 mg Rectal Daily    sodium chloride flush  10 mL Intravenous BID     PRN Meds: sodium chloride flush, polyethylene glycol, promethazine **OR** ondansetron, ketorolac    Labs:   Recent Labs     11/03/20  1500 11/04/20  0520   WBC 8.3 7.3   HGB 12.3 11.7*   HCT 36.7* 35.0*    200     Recent Labs     11/03/20  1500 11/03/20  1514 11/04/20  0520   *  --  141   K 3.8  --  4.2     --  108*   CO2 24  --  24   BUN 13  --  17   CREATININE 0.76 0.7 0.69   CALCIUM 8.4*  --  8.3*     Recent Labs     11/03/20  1500   AST 14   ALT 9   BILITOT 0.4   ALKPHOS 49     Recent Labs     11/03/20  1500   INR 1.0     Recent Labs     11/03/20  1500   TROPONINI <0.010       Urinalysis:   Lab Results   Component Value Date    NITRU Negative 11/03/2020    WBCUA 10-20 08/13/2019    BACTERIA MANY 08/13/2019    RBCUA 6-10 08/13/2019    BLOODU Negative 11/03/2020    SPECGRAV 1.067 11/03/2020    GLUCOSEU Negative 11/03/2020       Radiology:   Most recent    EEG No procedure found. MRI of Brain No results found for this or any previous visit. No results found for this or any previous visit. MRA of the Head and Neck: No results found for this or any previous visit. No results found for this or any previous visit. No results found for this or any previous visit. CT of the Head:   Results for orders placed during the hospital encounter of 11/03/20   CT HEAD WO CONTRAST    Narrative CT HEAD WO CONTRAST    CLINICAL HISTORY:   weakness     COMPARISON:  NONE AVAILABLE    TECHNIQUE: Multiple unenhanced serial axial images of the brain from the vertex of the skull to the base of the skull were performed. FINDINGS:  The ventricles are of normal size and configuration. No mass or midline shift. The cisterns are unremarkable. No acute intra-axial or extra-axial findings. The visualized osseous structures are unremarkable. The visualized paranasal sinuses and mastoids are unremarkable. IMPRESSION    NO ACUTE INTRA-AXIAL OR EXTRA AXIAL FINDINGS. IF SIGNS OR SYMPTOMS PERSIST THEN CONSIDER MRI TO FURTHER EVALUATE IF THERE ARE NO CONTRAINDICATIONS    DR. Lloyd Fiore EMERGENCY WAS NOTIFIED IMMEDIATELY OF THE ABOVE FINDINGS ON NOVEMBER 3, 2020 1517 HOURS    All CT scans at this facility use dose modulation, iterative reconstruction, and/or weight based dosing when appropriate to reduce radiation dose to as low as reasonably achievable. No results found for this or any previous visit. No results found for this or any previous visit. Carotid duplex: No results found for this or any previous visit. No results found for this or any previous visit. No results found for this or any previous visit. Echo No results found for this or any previous visit.           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 Will obtain HERMAN and place cardiology on consult. PFO can contribute to hemiplegic migraines as well as CVA. Hypercoagulable work-up  Further recommendations to follow  Collaborating physicians: Dr Olga Lidia Garrett    I independently performed an evaluation on this patient. I have reviewed the above documentation completed by the Nurse Practitioner. Please see my additional contributions to the HPI, physical exam, assessment/medical decision making. Josue Garrett MD, [id number], American Board of Psychiatry & Neurology  Board Certified in Vascular Neurology  Board Certified in Neuromuscular Medicine  Certified in Neurorehabilitation       Electronically signed by ELÍAS Mallory CNP on 11/4/2020 at 1:45 PM

## 2020-11-04 NOTE — PLAN OF CARE
Problem: Infection:  Goal: Will remain free from infection  Description: Will remain free from infection  Outcome: Ongoing     Problem: Safety:  Goal: Free from accidental physical injury  Description: Free from accidental physical injury  Outcome: Ongoing  Goal: Free from intentional harm  Description: Free from intentional harm  Outcome: Ongoing     Problem: Daily Care:  Goal: Daily care needs are met  Description: Daily care needs are met  Outcome: Ongoing     Problem: Skin Integrity:  Goal: Skin integrity will stabilize  Description: Skin integrity will stabilize  Outcome: Ongoing     Problem: Discharge Planning:  Goal: Patients continuum of care needs are met  Description: Patients continuum of care needs are met  Outcome: Ongoing

## 2020-11-04 NOTE — CARE COORDINATION
SPOKE WITH NURSING. PER NURSING DRAGS R FOOT SLIGHTLY WITH WALKING. POSITIVE BUBBLE STUDY TO HAVE HERMAN IN THE AM.   EEG DONE NO RESULTS YET.  Electronically signed by Lupe Olvera RN on 11/4/2020 at 5:17 PM

## 2020-11-04 NOTE — FLOWSHEET NOTE
Pt. A&Ox4. Pt. Has c/o of r side facial numbness & R leg numbness. Pt's right arm & leg are weak. Pt. Drags her R leg slightly as she walks. Pt. C/o of migraine pain. Dr. Francisco Javier Obrien perfectserved & he ordered Imitrex. Pt. Is resting in bed. Pt. Stated that she is tired. Pt. Had an EEG today.

## 2020-11-04 NOTE — CONSULTS
service: Not on file     Active member of club or organization: Not on file     Attends meetings of clubs or organizations: Not on file     Relationship status: Not on file    Intimate partner violence     Fear of current or ex partner: Not on file     Emotionally abused: Not on file     Physically abused: Not on file     Forced sexual activity: Not on file   Other Topics Concern    Not on file   Social History Narrative    Not on file      [] Unable to obtain due to ventilated and/ or neurologic status      Home Medications:      Medications Prior to Admission: butalbital-acetaminophen-caffeine (FIORICET, ESGIC) -40 MG per tablet, TAKE 1 TABLET BY MOUTH EVERY 6 HOURS AS NEEDED FOR HEADACHES  metoclopramide (REGLAN) 10 MG tablet, Take 1 tablet by mouth 3 times daily as needed (nausea)  ondansetron (ZOFRAN) 8 MG tablet, Take 1 tablet by mouth every 8 hours as needed for Nausea or Vomiting  [DISCONTINUED] fluticasone (FLONASE) 50 MCG/ACT nasal spray, 1 spray by Nasal route daily  ibuprofen (ADVIL;MOTRIN) 600 MG tablet, TAKE 1 TABLET BY MOUTH 4 TIMES DAILY AS NEEDED FOR PAIN  [DISCONTINUED] Elastic Bandages & Supports (ABDOMINAL BINDER/ELASTIC MED) MISC, 1 Device by Does not apply route daily as needed (abdominal support)  ferrous sulfate 325 (65 Fe) MG tablet, Take 1 tablet by mouth 2 times daily    Current Hospital Medications:     Scheduled Meds:   sodium chloride flush  10 mL Intravenous 2 times per day    enoxaparin  40 mg Subcutaneous Daily    aspirin  81 mg Oral Daily    Or    aspirin  300 mg Rectal Daily    sodium chloride flush  10 mL Intravenous BID     Continuous Infusions:  PRN Meds:.sodium chloride flush, polyethylene glycol, promethazine **OR** ondansetron, ketorolac  . Allergies:     No Known Allergies     Review of Systems:       Review of Systems   Constitutional: Negative. Negative for diaphoresis and fatigue. HENT: Negative. Eyes: Negative. Respiratory: Negative. Negative for cough, chest tightness, shortness of breath, wheezing and stridor. Cardiovascular: Negative. Negative for chest pain, palpitations and leg swelling. Gastrointestinal: Negative. Negative for blood in stool and nausea. Genitourinary: Negative. Musculoskeletal: Negative. Skin: Negative. Neurological: Negative. Negative for dizziness, syncope, weakness and light-headedness. Hematological: Negative. Psychiatric/Behavioral: Negative. Objective Findings:     Vitals:BP (!) 94/58   Pulse 89   Temp 97.7 °F (36.5 °C) (Oral)   Resp 16   Ht 5' 3\" (1.6 m)   Wt 140 lb (63.5 kg)   LMP 10/18/2020   SpO2 99%   BMI 24.80 kg/m²      Physical Examination:    Physical Exam   Constitutional: She appears healthy. No distress. HENT:   Normal cephalic and Atraumatic   Eyes: Pupils are equal, round, and reactive to light. Neck: Normal range of motion and thyroid normal. Neck supple. No JVD present. No neck adenopathy. No thyromegaly present. Cardiovascular: Normal rate, regular rhythm, normal heart sounds, intact distal pulses and normal pulses. Pulmonary/Chest: Effort normal and breath sounds normal. She has no wheezes. She has no rales. She exhibits no tenderness. Abdominal: Soft. Bowel sounds are normal. There is no abdominal tenderness. Musculoskeletal: Normal range of motion. General: No tenderness or edema. Neurological: She is alert and oriented to person, place, and time. Skin: Skin is warm. No cyanosis. Nails show no clubbing.          Results/ Medications reviewed 11/4/2020, 4:05 PM     Laboratory, Microbiology, Pathology, Radiology, Cardiology, Medications and Transcriptions reviewed  Scheduled Meds:   sodium chloride flush  10 mL Intravenous 2 times per day    enoxaparin  40 mg Subcutaneous Daily    aspirin  81 mg Oral Daily    Or    aspirin  300 mg Rectal Daily    sodium chloride flush  10 mL Intravenous BID     Continuous Infusions:    Recent Labs perfused     Posterior Circulation: No large vessel occlusion or significant stenosis. Patent left posterior communicating artery. Basilar artery:No significant stenosis. Aneurysm No aneurysm or dissection in the anterior or posterior circulations. .     Neurocranium Negative     Dural sinus: Dural venous sinuses are unremarkable. EXAMINATION: CTA NECK W WO CONTRAST, CTA HEAD W WO CONTRAST. CTA NECK DATE AND TIME:11/3/2020 3:11 PM CLINICAL HISTORY: Stroke symptoms   rt sided weakness and paresthesia  TECHNIQUE: TECHNIQUE:Helical CTA of the head and neck was performed from the vertex to the aortic arch following the uneventful intravenous administration of 100 cc of nonionic contrast without incident. 2-D images were reconstructed in the sagittal and  coronal planes. Three Dimensional Maximum Intensity Projection (3D-MIP) images were created. All images were reviewed and primarily archived to PACS workstation. All CT scans at this facility use dose modulation, iterative reconstruction, and/or weight  based dosing when appropriate to reduce radiation dose to as low as reasonably achievable. NASCET Criteria were utilized Result / Findings: CTA NECK: Aortic Arch: No stenosis aneurysm or dissection. Carotid:   Right  Carotid Stenosis (% by NASCET Criteria):  Less than 50%         Left  Carotid Stenosis (% by NASCET Criteria):  Less than 50%     Cervical Vertebral Arteries:    Patency: No stenosis aneurysm or dissection. Vertebral arteries are codominant. Ct Head Wo Contrast    Result Date: 11/3/2020  CT HEAD WO CONTRAST CLINICAL HISTORY:   weakness COMPARISON:  NONE AVAILABLE TECHNIQUE: Multiple unenhanced serial axial images of the brain from the vertex of the skull to the base of the skull were performed. FINDINGS:  The ventricles are of normal size and configuration. No mass or midline shift. The cisterns are unremarkable. No acute intra-axial or extra-axial findings.   The visualized osseous structures are unremarkable. The visualized paranasal sinuses and mastoids are unremarkable. IMPRESSION NO ACUTE INTRA-AXIAL OR EXTRA AXIAL FINDINGS. IF SIGNS OR SYMPTOMS PERSIST THEN CONSIDER MRI TO FURTHER EVALUATE IF THERE ARE NO CONTRAINDICATIONS DR. Abdullahi HUDDLESTON WAS NOTIFIED IMMEDIATELY OF THE ABOVE FINDINGS ON NOVEMBER 3, 2020 1517 HOURS All CT scans at this facility use dose modulation, iterative reconstruction, and/or weight based dosing when appropriate to reduce radiation dose to as low as reasonably achievable. Cta Neck W Wo Contrast    CTA HEAD AND CTA NECK NO LARGE VESSEL OCCLUSION OR SIGNIFICANT STENOSIS. NO SIGNS OF DISSECTION, VASCULAR MALFORMATION, OR EVIDENCE OF DURAL VENOUS THROMBOSIS. EXAMINATION: CTA NECK W WO CONTRAST, CTA HEAD W WO CONTRAST CTA HEAD DATE AND TIME:11/3/2020 3:11 PM CLINICAL HISTORY: Stroke  rt sided weakness and paresthesia  COMPARISON: None TECHNIQUE:Helical CTA of the head and neck was performed from the vertex to the aortic arch following the uneventful intravenous administration of 100 cc of nonionic contrast without incident. 2-D images were reconstructed in the sagittal and coronal planes. Three Dimensional Maximum Intensity Projection (3D-MIP) images were created. All images were reviewed and primarily archived to PACS workstation. All CT scans at this facility use dose modulation, iterative reconstruction, and/or weight based dosing when appropriate to reduce radiation dose to as low as reasonably achievable. NASCET Criteria were utilized FINDINGS CTA Head     Intracranial ICAs: Normal flow in precavernous, cavernous, clinoid and supraclinoid segments of the internal carotid arteries bilaterally     Anterior cerebral arteries: There is a slightly hypoplastic right A1 segment with dominant left A1 segment. The distal anterior cerebral artery segments are patent. Middle cerebral arteries:No large vessel occlusion or significant stenosis.  Bilateral MCA segments are well perfused     Posterior Circulation: No large vessel occlusion or significant stenosis. Patent left posterior communicating artery. Basilar artery:No significant stenosis. Aneurysm No aneurysm or dissection in the anterior or posterior circulations. .     Neurocranium Negative     Dural sinus: Dural venous sinuses are unremarkable. EXAMINATION: CTA NECK W WO CONTRAST, CTA HEAD W WO CONTRAST. CTA NECK DATE AND TIME:11/3/2020 3:11 PM CLINICAL HISTORY: Stroke symptoms   rt sided weakness and paresthesia  TECHNIQUE: TECHNIQUE:Helical CTA of the head and neck was performed from the vertex to the aortic arch following the uneventful intravenous administration of 100 cc of nonionic contrast without incident. 2-D images were reconstructed in the sagittal and  coronal planes. Three Dimensional Maximum Intensity Projection (3D-MIP) images were created. All images were reviewed and primarily archived to PACS workstation. All CT scans at this facility use dose modulation, iterative reconstruction, and/or weight  based dosing when appropriate to reduce radiation dose to as low as reasonably achievable. NASCET Criteria were utilized Result / Findings: CTA NECK: Aortic Arch: No stenosis aneurysm or dissection. Carotid:   Right  Carotid Stenosis (% by NASCET Criteria):  Less than 50%         Left  Carotid Stenosis (% by NASCET Criteria):  Less than 50%     Cervical Vertebral Arteries:    Patency: No stenosis aneurysm or dissection. Vertebral arteries are codominant. Xr Chest Portable    Result Date: 11/3/2020  Exam: XR CHEST PORTABLE History: Weakness Technique: AP portable view of the chest obtained. Comparison: None available Findings: The cardiomediastinal silhouette is within normal limits. No pneumothorax, pleural effusion, or consolidation. Bones of the thorax appear intact. No radiographic evidence of acute intrathoracic process.        Active Hospital Problems    Diagnosis Date Noted    Stroke-like episode [R29.90] 11/03/2020     Priority: Low         Impression/Plan:   1. TIA/CVA vs Complex Migraine - we will perform HERMAN in AM. RBA discussed with pt. Thank you for allowing us to participate in the care of this patient. Will continue to follow. Please call if questions or concerns arise.     Electronically signed by Maya Goldsmith MD on 11/4/2020 at 4:05 PM

## 2020-11-04 NOTE — PROGRESS NOTES
Physical Therapy Med Surg Initial Assessment  Facility/Department: [facility]  Room: [facility]/Z871-13       NAME: Semaj Estrella  : 1995 (23 y.o.)  MRN: [id number]  CODE STATUS: Full Code    Date of Service: 2020    Patient Diagnosis(es): Stroke-like episode [R29.90]   Chief Complaint   Patient presents with    Other     numbness to right side of face and rue     Patient Active Problem List    Diagnosis Date Noted    Stroke-like episode 2020    Admitted to labor and delivery 2019    39 weeks gestation of pregnancy     Threatened labor     Acute cystitis without hematuria     38 weeks gestation of pregnancy     Term pregnancy     Decreased fetal movements in third trimester         Past Medical History:   Diagnosis Date    Anemia     Anxiety     Depression      History reviewed. No pertinent surgical history. Chart Reviewed: Yes  Patient assessed for rehabilitation services?: Yes  Family / Caregiver Present: Yes(fiance)  General Comment  Comments: Pt resting in bed - agreeable to PT evaluation    Restrictions:  Restrictions/Precautions: Fall Risk(Medium per vidal)     SUBJECTIVE: Subjective: \"My arm is getting better, but my face and part of my leg is still numb. \"    Pain  Pre Treatment Pain Screening  Comments / Details: denies    Post Treatment Pain Screening:   Pain Assessment  Pain Assessment: (denies)    Prior Level of Function:  Social/Functional History  Lives With: Significant other  Type of Home: House  Home Layout: Two level, Bed/Bath upstairs, Performs ADL's on one level  Home Access: Stairs to enter with rails  Entrance Stairs - Number of Steps: 2  Entrance Stairs - Rails: None  Bathroom Shower/Tub: Tub/Shower unit  Home Equipment: (NA)  Receives Help From: Family  ADL Assistance: Independent  Homemaking Assistance: Independent  Ambulation Assistance: Independent  Transfer Assistance: Independent  Active :  Yes  Additional Comments: currently unemployed d/t covid; pt states that she does exprience HAs however they have been less frequent (every other month) and managed well with medication. Pt denies any new or overwhelming stressors in her life. OBJECTIVE:   Vision Exceptions: Wears glasses at all times  Hearing: Within functional limits    Cognition:  Overall Orientation Status: Within Normal Limits  Follows Commands: Within Functional Limits    Observation/Palpation  Observation: Pt. alert and attentive, pleasant    ROM:  RLE PROM: WFL  LLE PROM: WFL    Strength:  Strength RLE  Strength RLE: WFL  Comment: noticed inconsistent force production with MMT  Strength LLE  Strength LLE: WFL    Neuro:  Balance  Sitting - Static: Good  Sitting - Dynamic: Good  Standing - Static: Good  Standing - Dynamic: Good;-  Comments: retrieves object from floor with indep     Motor Control  Gross Motor?: WFL(B patellar and achilles WFL)  Sensation  Overall Sensation Status: (reports right facial numbness; R lateral aspect of calf)    Bed mobility  Rolling to Left: Independent  Rolling to Right: Independent  Supine to Sit: Independent  Sit to Supine: Independent  Scooting: Independent    Transfers  Sit to Stand: Independent  Stand to sit: Independent  Bed to Chair: Independent    Ambulation  Ambulation?: Yes  Ambulation 1  Surface: level tile  Device: No Device  Assistance: Modified Independent; Independent  Quality of Gait: Mild instability R ankle during R stance phase, however no LOB or overt postural instability observed. Pt states that she has been ambulating to the restroom without difficulty.  Reports that her R leg \"just feels funny\"  Distance: 20ft with trun              Activity Tolerance  Activity Tolerance: Patient Tolerated treatment well          PT Education  PT Education: PT Role    ASSESSMENT:   Decision Making: Low Complexity  History: Med  Exam: Med  Clinical Presentation: Low  No Skilled PT: Safe to return home    Prognosis: Good  Barriers to Learning:

## 2020-11-04 NOTE — PROGRESS NOTES
Assessment as charted  Pt c/o weakness on right side. On exam, no weakness noted compared to left. While ambulating to bathroom, some right foot drag noticed. Lung sounds clear and diminished, pt states she does not smoke.

## 2020-11-04 NOTE — PROGRESS NOTES
[facility] OCCUPATIONAL THERAPY EVALUATION - ACUTE     NAME: Zoraida Pinedo  : 1995 (23 y.o.)  MRN: [id number]  CODE STATUS: Full Code  [id number]    Date of Service: 2020    Patient Diagnosis(es): Stroke-like episode [R29.90]   Chief Complaint   Patient presents with    Other     numbness to right side of face and rue     Patient Active Problem List    Diagnosis Date Noted    Stroke-like episode 2020    Admitted to labor and delivery 2019    44 weeks gestation of pregnancy     Threatened labor     Acute cystitis without hematuria     38 weeks gestation of pregnancy     Term pregnancy     Decreased fetal movements in third trimester         Past Medical History:   Diagnosis Date    Anemia     Anxiety     Depression      History reviewed. No pertinent surgical history. Restrictions  Restrictions/Precautions: Fall Risk(Medium per [employer])     Safety Devices: Safety Devices  Safety Devices in place: Not Applicable        Subjective  Pre Treatment Pain Screening  Pain at present: 0  Scale Used: Numeric Score  Intervention List: Patient able to continue with treatment, Patient declined any intervention    Pain Reassessment:   Pain Assessment  Patient Currently in Pain: No  Pain Assessment: 0-10  Pain Level: 0       Prior Level of Function:  Social/Functional History  Lives With: Significant other  Type of Home: House  Home Layout: Two level, Bed/Bath upstairs, Performs ADL's on one level  Home Access: Stairs to enter with rails  Entrance Stairs - Number of Steps: 2  Entrance Stairs - Rails: None  Bathroom Shower/Tub: Tub/Shower unit  Home Equipment: (NA)  Receives Help From: Family  ADL Assistance: Independent  Homemaking Assistance: Independent  Ambulation Assistance: Independent  Transfer Assistance: Independent  Active :  Yes  Additional Comments: currently unemployed d/t covid    OBJECTIVE:     Orientation Status:  Orientation  Overall Orientation Status: level but does not need physical assistance, requires verbal cues  Minimal, Moderate, Maximal Assistance = Pt. requires physical assistance (25%, 50%, 75% assist from helper) for task but is able to actively participate in task   Dependent = Pt. requires total assistance with task and is not able to actively participate with task completion     Functional Mobility:  Functional Mobility  Functional - Mobility Device: No device  Activity: To/from bathroom  Assist Level: Independent  Functional Mobility Comments: Slow gait, decreased R step length but G safety  Transfers  Sit to stand: Independent  Stand to sit: Independent  Transfer Comments: No LOB or difficulty with transfers    Bed Mobility  Bed mobility  Rolling to Left: Independent  Rolling to Right: Independent  Supine to Sit: Independent  Sit to Supine: Independent  Scooting: Independent    Seated and Standing Balance:  Balance  Sitting Balance: Independent  Standing Balance: Independent    Functional Endurance:  Activity Tolerance  Activity Tolerance: Patient Tolerated treatment well    D/C Recommendations:  OT D/C RECOMMENDATIONS  REQUIRES OT FOLLOW UP: No    Equipment Recommendations:  OT Equipment Recommendations  Equipment Needed: No    OT Education:   OT Education  OT Education: OT Role, Plan of Care  Patient Education: Educated pt. on role of acute care OT  Barriers to Learning: None    OT Follow Up:  OT D/C RECOMMENDATIONS  REQUIRES OT FOLLOW UP: No       Assessment/Discharge Disposition:  Assessment: Pt. is a 23year old woman from home with family who presents to [id number] [facility] with CVA symptoms; pt's symptoms have resolved and pt. reports feeling mostly back to baseline. No acute OT indicated.   Prognosis: Good  No Skilled OT: Independent with functional mobility, Independent with ADL's  Decision Making: Low Complexity  History: Pt's medical history is moderately complex  Exam: Pt. has no significant performance deficits  Assistance / Modification: No

## 2020-11-05 ENCOUNTER — APPOINTMENT (OUTPATIENT)
Dept: CARDIAC CATH/INVASIVE PROCEDURES | Age: 25
DRG: 102 | End: 2020-11-05
Payer: COMMERCIAL

## 2020-11-05 VITALS
TEMPERATURE: 98.1 F | SYSTOLIC BLOOD PRESSURE: 110 MMHG | WEIGHT: 140 LBS | OXYGEN SATURATION: 100 % | RESPIRATION RATE: 13 BRPM | HEIGHT: 63 IN | BODY MASS INDEX: 24.8 KG/M2 | DIASTOLIC BLOOD PRESSURE: 67 MMHG | HEART RATE: 73 BPM

## 2020-11-05 PROBLEM — G43.809 MIGRAINE VARIANT: Status: ACTIVE | Noted: 2020-11-05

## 2020-11-05 LAB
LV EF: 55 %
LVEF MODALITY: NORMAL
LYME, EIA: 0.55 LIV (ref 0–1.2)

## 2020-11-05 PROCEDURE — 1210000000 HC MED SURG R&B

## 2020-11-05 PROCEDURE — 99233 SBSQ HOSP IP/OBS HIGH 50: CPT | Performed by: PSYCHIATRY & NEUROLOGY

## 2020-11-05 PROCEDURE — 93312 ECHO TRANSESOPHAGEAL: CPT

## 2020-11-05 PROCEDURE — B24BZZ4 ULTRASONOGRAPHY OF HEART WITH AORTA, TRANSESOPHAGEAL: ICD-10-PCS | Performed by: INTERNAL MEDICINE

## 2020-11-05 PROCEDURE — 95816 EEG AWAKE AND DROWSY: CPT | Performed by: PSYCHIATRY & NEUROLOGY

## 2020-11-05 PROCEDURE — 2580000003 HC RX 258: Performed by: INTERNAL MEDICINE

## 2020-11-05 PROCEDURE — APPSS15 APP SPLIT SHARED TIME 0-15 MINUTES: Performed by: NURSE PRACTITIONER

## 2020-11-05 PROCEDURE — 93321 DOPPLER ECHO F-UP/LMTD STD: CPT

## 2020-11-05 PROCEDURE — 2580000003 HC RX 258

## 2020-11-05 PROCEDURE — 6360000002 HC RX W HCPCS: Performed by: INTERNAL MEDICINE

## 2020-11-05 PROCEDURE — 6370000000 HC RX 637 (ALT 250 FOR IP): Performed by: INTERNAL MEDICINE

## 2020-11-05 PROCEDURE — 6360000002 HC RX W HCPCS

## 2020-11-05 PROCEDURE — 93325 DOPPLER ECHO COLOR FLOW MAPG: CPT

## 2020-11-05 PROCEDURE — 93312 ECHO TRANSESOPHAGEAL: CPT | Performed by: INTERNAL MEDICINE

## 2020-11-05 RX ORDER — BACTERIOSTATIC SODIUM CHLORIDE 0.9 %
VIAL (ML) INJECTION
Status: DISCONTINUED
Start: 2020-11-05 | End: 2020-11-05 | Stop reason: HOSPADM

## 2020-11-05 RX ORDER — LIDOCAINE HYDROCHLORIDE 20 MG/ML
15 SOLUTION OROPHARYNGEAL ONCE
Status: DISCONTINUED | OUTPATIENT
Start: 2020-11-05 | End: 2020-11-05 | Stop reason: HOSPADM

## 2020-11-05 RX ORDER — SODIUM CHLORIDE 0.9 % (FLUSH) 0.9 %
10 SYRINGE (ML) INJECTION PRN
Status: CANCELLED | OUTPATIENT
Start: 2020-11-05

## 2020-11-05 RX ORDER — ASPIRIN 81 MG/1
81 TABLET ORAL DAILY
Qty: 30 TABLET | Refills: 3 | Status: SHIPPED | OUTPATIENT
Start: 2020-11-06 | End: 2022-03-29

## 2020-11-05 RX ORDER — SODIUM CHLORIDE 9 MG/ML
INJECTION, SOLUTION INTRAVENOUS
Status: COMPLETED
Start: 2020-11-05 | End: 2020-11-05

## 2020-11-05 RX ORDER — BACTERIOSTATIC SODIUM CHLORIDE 0.9 %
10 VIAL (ML) INJECTION ONCE
Status: DISCONTINUED | OUTPATIENT
Start: 2020-11-05 | End: 2020-11-05 | Stop reason: HOSPADM

## 2020-11-05 RX ORDER — SODIUM CHLORIDE 0.9 % (FLUSH) 0.9 %
10 SYRINGE (ML) INJECTION EVERY 12 HOURS SCHEDULED
Status: CANCELLED | OUTPATIENT
Start: 2020-11-05

## 2020-11-05 RX ORDER — SODIUM CHLORIDE 9 MG/ML
INJECTION, SOLUTION INTRAVENOUS CONTINUOUS
Status: CANCELLED | OUTPATIENT
Start: 2020-11-05

## 2020-11-05 RX ADMIN — ENOXAPARIN SODIUM 40 MG: 40 INJECTION SUBCUTANEOUS at 12:08

## 2020-11-05 RX ADMIN — KETOROLAC TROMETHAMINE 15 MG: 15 INJECTION, SOLUTION INTRAMUSCULAR; INTRAVENOUS at 12:07

## 2020-11-05 RX ADMIN — ASPIRIN 81 MG: 81 TABLET, COATED ORAL at 12:07

## 2020-11-05 RX ADMIN — SODIUM CHLORIDE 1000 ML: 9 INJECTION, SOLUTION INTRAVENOUS at 08:50

## 2020-11-05 RX ADMIN — Medication 10 ML: at 12:08

## 2020-11-05 ASSESSMENT — ENCOUNTER SYMPTOMS
COLOR CHANGE: 0
TROUBLE SWALLOWING: 0
COUGH: 0
VOMITING: 0
WHEEZING: 0
NAUSEA: 0
SHORTNESS OF BREATH: 0
CHEST TIGHTNESS: 0

## 2020-11-05 ASSESSMENT — PAIN SCALES - GENERAL
PAINLEVEL_OUTOF10: 0
PAINLEVEL_OUTOF10: 7

## 2020-11-05 NOTE — PROGRESS NOTES
[facility] Neurology Daily Progress Note  Name: Fay Brooke  Age: 23 y.o. Gender: female  CodeStatus: Full Code  Allergies: No Known Allergies    Chief Complaint:Other (numbness to right side of face and rue)    Primary Care Provider: Ángel Solitario MD  InpatientTreatment Team: Treatment Team: Attending Provider: David Elizondo DO; Consulting Physician: Sharen Kawasaki, MD; Consulting Physician: Walter Potter MD; Registered Nurse: Vladimir Bullock RN; : Acosta Brown RN; Utilization Reviewer: Silvia Del Cid, SAEID; Registered Nurse: Lillian Miller RN; Patient Care Tech: Winnie Bolton  Admission Date: 11/3/2020      HPI   Pt seen and examined for neuro follow up for right-sided paresthesias. Currently alert and oriented x3, no acute distress, cooperative. Patient continues to have paresthesias to her right face, right arm right leg. Reports right arm heaviness. No new focal deficits. No headaches, photophobia, dizziness, dysarthria, dysphagia. No chest pain pressure or palpitations. No new or acute neuro complaints or concerns. Vitals:    11/05/20 1031   BP:    Pulse: 73   Resp:    Temp:    SpO2: 100%      Review of Systems   Constitutional: Negative for appetite change, chills, fatigue and fever. HENT: Negative for hearing loss and trouble swallowing. Eyes: Negative for visual disturbance. Respiratory: Negative for cough, chest tightness, shortness of breath and wheezing. Cardiovascular: Negative for chest pain, palpitations and leg swelling. Gastrointestinal: Negative for nausea and vomiting. Musculoskeletal: Negative for gait problem. Skin: Negative for color change and rash. Neurological: Positive for numbness. Negative for dizziness, tremors, seizures, syncope, facial asymmetry, speech difficulty, weakness, light-headedness and headaches. Psychiatric/Behavioral: Negative for agitation, confusion and hallucinations. The patient is not nervous/anxious.       Physical Exam  Vitals signs and nursing note reviewed. Constitutional:       General: She is not in acute distress. Appearance: She is not ill-appearing or diaphoretic. HENT:      Head: Normocephalic and atraumatic. Eyes:      General: No visual field deficit. Extraocular Movements: Extraocular movements intact. Pupils: Pupils are equal, round, and reactive to light. Cardiovascular:      Rate and Rhythm: Normal rate and regular rhythm. Pulmonary:      Effort: Pulmonary effort is normal. No respiratory distress. Breath sounds: Normal breath sounds. Abdominal:      General: Bowel sounds are normal. There is no distension. Palpations: Abdomen is soft. Tenderness: There is no abdominal tenderness. Skin:     General: Skin is warm and dry. Neurological:      Mental Status: She is alert and oriented to person, place, and time. Cranial Nerves: No cranial nerve deficit, dysarthria or facial asymmetry. Motor: No weakness, tremor, atrophy, abnormal muscle tone, seizure activity or pronator drift. Coordination: Romberg sign negative.  Finger-Nose-Finger Test normal.      Gait: Gait normal.               Medications:  Reviewed    Infusion Medications:   Scheduled Medications:    sodium chloride bacteriostatic  10 mL Injection Once    sodium chloride bacteriostatic        lidocaine viscous hcl  15 mL Mouth/Throat Once    sodium chloride flush  10 mL Intravenous 2 times per day    enoxaparin  40 mg Subcutaneous Daily    aspirin  81 mg Oral Daily    Or    aspirin  300 mg Rectal Daily    sodium chloride flush  10 mL Intravenous BID     PRN Meds: sodium chloride flush, polyethylene glycol, promethazine **OR** ondansetron, ketorolac    Labs:   Recent Labs     11/03/20  1500 11/04/20  0520   WBC 8.3 7.3   HGB 12.3 11.7*   HCT 36.7* 35.0*    200     Recent Labs     11/03/20  1500 11/03/20  1514 11/04/20  0520   *  --  141   K 3.8  --  4.2     --  108*   CO2 24 --  24   BUN 13  --  17   CREATININE 0.76 0.7 0.69   CALCIUM 8.4*  --  8.3*     Recent Labs     11/03/20  1500   AST 14   ALT 9   BILITOT 0.4   ALKPHOS 49     Recent Labs     11/03/20  1500   INR 1.0     Recent Labs     11/03/20  1500   TROPONINI <0.010       Urinalysis:   Lab Results   Component Value Date    NITRU Negative 11/03/2020    WBCUA 10-20 08/13/2019    BACTERIA MANY 08/13/2019    RBCUA 6-10 08/13/2019    BLOODU Negative 11/03/2020    SPECGRAV 1.067 11/03/2020    GLUCOSEU Negative 11/03/2020       Radiology:   Most recent    EEG No procedure found. MRI of Brain   Results for orders placed during the hospital encounter of 11/03/20   MRI brain with and without contrast    Narrative EXAMINATION: MRI BRAIN W WO CONTRAST     CLINICAL HISTORY:  R side weakness / paresthesia. r/o CVA or structural lesion     COMPARISONS: NONE AVAILABLE    TECHNIQUE:    Multiplanar multisequence images of the brain were obtained before and after IV administration of contrast. Diffusion perfusion imaging was obtained. FINDINGS:      There are no extra-axial collections. There is no evidence of hemorrhage. There are no areas of signal abnormality on perfusion diffusion imaging to suggest ischemia. The susceptibility images do not demonstrate evidence of hemosiderin deposition within   the brain parenchyma or the leptomeninges. There is preservation of the gray-white matter differentiation. There are no areas of signal abnormality within the brain parenchyma or the posterior fossa to suggest lesion. There are no areas of abnormal enhancement after IV contrast administration. The sulci and ventricles are within normal limits without evidence of hydrocephalus. The midline structures are intact, the corpus callosum is within normal limits. The region of the pineal gland and the sella turcica are unremarkable. There are no space-occupying lesions in the posterior fossa. The basilar cisterns are patent.  The craniocervical junction is unremarkable. The visualized portions of the orbits are within normal limits, the globes are intact. The visualized portions of the paranasal sinuses are within normal limits. The calvarium and soft tissues are unremarkable. Impression There are no acute intracranial changes, no evidence of ischemia or hemorrhage. There are no regions of signal abnormality. No results found for this or any previous visit. MRA of the Head and Neck: No results found for this or any previous visit. No results found for this or any previous visit. No results found for this or any previous visit. CT of the Head:   Results for orders placed during the hospital encounter of 11/03/20   CT HEAD WO CONTRAST    Narrative CT HEAD WO CONTRAST    CLINICAL HISTORY:   weakness     COMPARISON:  NONE AVAILABLE    TECHNIQUE: Multiple unenhanced serial axial images of the brain from the vertex of the skull to the base of the skull were performed. FINDINGS:  The ventricles are of normal size and configuration. No mass or midline shift. The cisterns are unremarkable. No acute intra-axial or extra-axial findings. The visualized osseous structures are unremarkable. The visualized paranasal sinuses and mastoids are unremarkable. IMPRESSION    NO ACUTE INTRA-AXIAL OR EXTRA AXIAL FINDINGS. IF SIGNS OR SYMPTOMS PERSIST THEN CONSIDER MRI TO FURTHER EVALUATE IF THERE ARE NO CONTRAINDICATIONS    DR. Mercer [facility] WAS NOTIFIED IMMEDIATELY OF THE ABOVE FINDINGS ON NOVEMBER 3, 2020 1517 HOURS    All CT scans at this facility use dose modulation, iterative reconstruction, and/or weight based dosing when appropriate to reduce radiation dose to as low as reasonably achievable. No results found for this or any previous visit. No results found for this or any previous visit. Carotid duplex: No results found for this or any previous visit. No results found for this or any previous visit. No results found for this or any previous visit. Echo No results found for this or any previous visit. 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 Will obtain HERMAN and place cardiology on consult. PFO can contribute to hemiplegic migraines as well as CVA. Hypercoagulable work-up  Further recommendations to follow    11/5/20:  MRI of the brain negative  HERMAN done and negative for PFO. Unable to reproduce positive bubble study findings  Patient likely with hemiplegic migraine or anxiety  We will continue aspirin 81 mg daily  Hypercoagulable work-up pending  Okay to DC from neurology standpoint  Follow-up 4 weeks in the office    I independently performed an evaluation on this patient. I have reviewed the above documentation completed by the Nurse Practitioner. Please see my additional contributions to the HPI, physical exam, assessment/medical decision making. Josue Sorenson MD, [address] American Board of Psychiatry & Neurology  Board Certified in Vascular Neurology  Board Certified in Neuromuscular Medicine  Certified in Neurorehabilitation         Collaborating physicians: Dr Lilo Sorenson    Electronically signed by ELÍAS Clemens CNP on 11/5/2020 at 1:06 PM

## 2020-11-05 NOTE — PLAN OF CARE
Problem: Infection:  Goal: Will remain free from infection  Description: Will remain free from infection  Outcome: Ongoing     Problem: Safety:  Goal: Free from accidental physical injury  Description: Free from accidental physical injury  Outcome: Ongoing  Goal: Free from intentional harm  Description: Free from intentional harm  Outcome: Ongoing     Problem: Daily Care:  Goal: Daily care needs are met  Description: Daily care needs are met  Outcome: Ongoing     Problem: Skin Integrity:  Goal: Skin integrity will stabilize  Description: Skin integrity will stabilize  Outcome: Ongoing     Problem: Discharge Planning:  Goal: Patients continuum of care needs are met  Description: Patients continuum of care needs are met  Outcome: Ongoing     Problem: Discharge Planning:  Goal: Patients continuum of care needs are met  Description: Patients continuum of care needs are met  Outcome: Ongoing     Problem: HEMODYNAMIC STATUS  Goal: Patient has stable vital signs and fluid balance  Outcome: Ongoing     Problem: ACTIVITY INTOLERANCE/IMPAIRED MOBILITY  Goal: Mobility/activity is maintained at optimum level for patient  Outcome: Ongoing     Problem: COMMUNICATION IMPAIRMENT  Goal: Ability to express needs and understand communication  Outcome: Ongoing     Problem: Falls - Risk of:  Goal: Will remain free from falls  Description: Will remain free from falls  Outcome: Ongoing  Goal: Absence of physical injury  Description: Absence of physical injury  Outcome: Ongoing     Problem: Mobility - Impaired:  Goal: Mobility will improve  Description: Mobility will improve  Outcome: Ongoing     Problem: Pain:  Goal: Pain level will decrease  Description: Pain level will decrease  Outcome: Ongoing  Goal: Control of acute pain  Description: Control of acute pain  Outcome: Ongoing  Goal: Control of chronic pain  Description: Control of chronic pain  Outcome: Ongoing

## 2020-11-05 NOTE — FLOWSHEET NOTE
Stroke education given to pt before discharge. Electronically signed by Uma Jolly LPN on 68/8/5412 at 3:09 PM

## 2020-11-05 NOTE — PROGRESS NOTES
2000-Bedside rounding completed with Daria Allen RN.  Pt is a+o x 4.  Pt noted to be NSR 75 on tele at this time.  Pt denies pain of any kind and is resting comfortably in bed.  Pt instructed to call for assistance should she have further needs or wish to get out of bed during the shift.  Call bell and items are with in reach of pt.  Will continue to monitor.      2031-PM medication given according to [facility] Assessment completed, see Epic charting.  Pt denies pain of any kind and is resting comfortably in bed.  Pt instructed to call for assistance should she have further needs or wish to get out of bed during the shift. Call bell and items are with in reach of pt.  Will continue to monitor.     0000-Pt made NPO at this time for HERMAN in the am.  Pt aware of NPO status and states compliance. All food and drink removed from pt's bedside at this time. NPO sign posted on pt's door. Will continue to monitor.

## 2020-11-05 NOTE — CARE COORDINATION
Spoke with Dr. Andrea Felix and Rosalinda Corea CNP. Pt to be d/c HERMAN is negative. Spoke with pt to assess any home going needs, she denied any needs. She has a ride going home and can afford medications, has insurance. Electronically signed by Paula Gonzales RN on 11/5/2020 at 1:17 PM

## 2020-11-05 NOTE — PROGRESS NOTES
Spiritual Care Services     Summary of Visit:  PT was discharged and happily going home. She had her  with her. I comforted her and prayed for her total recovery. They were grateful. Spiritual Assessment/Intervention/Outcomes:    Encounter Summary  Services provided to[de-identified] Patient  Referral/Consult From[de-identified] Rounding  Support System: Spouse  Continue Visiting: No  Complexity of Encounter: Moderate  Length of Encounter: 15 minutes  Spiritual Assessment Completed: Yes  Routine  Type: Follow up  Assessment: Approachable, Calm  Intervention: Prayer, Nurtured hope  Outcome: Expressed gratitude, Expressed feelings of vern, peace, and/or awe  Crisis  Type: Code, Stroke Alert  Assessment: Calm, Approachable  Intervention: [place], Sustaining presence/ Ministry of presence  Outcome: Receptive           Advance Directives (For Healthcare)  Healthcare Directive: No, patient does not have an advance directive for healthcare treatment           Values / Beliefs  Do you have any ethnic, cultural, sacramental, or spiritual Confucianist needs you would like us to be aware of while you are in the hospital?: No    Care Plan:        [id number] [address]   Electronically signed by [facility] Staff on 11/5/20 at 3:51 PM EST     To reach a  for emotional and spiritual support, place an [facility] consult request.   If a  is needed immediately, dial 0 and ask to page the on-call .

## 2020-11-05 NOTE — DISCHARGE SUMMARY
[facility] Medicine Discharge Summary    Percy Langston  :  1995  MRN:  [id number]    Admit date:  11/3/2020  Discharge date:  2020    Admitting Physician:  Cyn Jacobson DO  Primary Care Physician: Stuart Babcock MD    Discharge Diagnoses: Active Problems:    Stroke-like episode    Migraine variant  Resolved Problems:    * No resolved hospital problems. *    Chief Complaint   Patient presents with    Other     numbness to right side of face and rue       Condition: improved   Activity: no restrictions  Diet: regular  Disposition: home  Functional Status: ambulatory    Significant Findings:     MRI Head:  There are no acute intracranial changes, no evidence of ischemia or hemorrhage. There are no regions of signal abnormality. HERMAN:    No PFO despite multiple attempts at [place] study. ( Recent TTE did demonstrate PFO, however)   No Thrombus   No Vegetations   Normal left ventricle structure and function. Left ventricular ejection fraction is visually estimated at 55%. Normal diastolic filling pattern for age. Hypercoagulable work-up pending    Hospital Course:   21-year-old female presented from home on 11/3 after experiencing right-sided numbness involving her face, upper extremity distal to her elbow, and lower extremity distal to her knee. Her symptoms were concerning for a strokelike syndrome however her MRI was negative. She had TTE which initially suggested PFO however there is no PFO found on HERMAN. She had EEG and hypercoagulable work-up sent which will be followed up as outpatient. There was no clear diagnosis-it is possible this was a TIA, complex migraine (however she did not have any headache on presentation), or conversion disorder (she has history of anxiety, but denies any recent stressful situation or increase in anxiety). She will follow-up with her PCP and with neurology in 3 to 4 weeks.   She did still have some decreased sensation on the right side of her face, right hand, and right leg distal to her knee at the time of discharge.       Key Medications / Medications changes:  Start aspirin 81 mg daily    Exam on Discharge:   /67   Pulse 73   Temp 98.1 °F (36.7 °C) (Temporal)   Resp 13   Ht 5' 3\" (1.6 m)   Wt 140 lb (63.5 kg)   LMP 10/18/2020   SpO2 100%   BMI 24.80 kg/m²   General appearance: alert, cooperative and no distress  Mental Status: oriented to person, place and time and normal affect  Lungs: clear to auscultation bilaterally, normal effort  Heart: regular rate and rhythm, no murmur  Abdomen: soft, nontender, nondistended, bowel sounds present, no masses  Extremities: no edema, redness, tenderness in the calves  Skin: no gross lesions, rashes  Neuro: decreased sensation on L side of face, L hand, and LLE distal to knee    Discharge Medication List:   Monique Bolanos   Home Medication Instructions WILMA:[id number]    Printed on:11/05/20 3911   Medication Information                      aspirin 81 MG EC tablet  Take 1 tablet by mouth daily             butalbital-acetaminophen-caffeine (FIORICET, ESGIC) -40 MG per tablet  TAKE 1 TABLET BY MOUTH EVERY 6 HOURS AS NEEDED FOR HEADACHES             ferrous sulfate 325 (65 Fe) MG tablet  Take 1 tablet by mouth 2 times daily             ibuprofen (ADVIL;MOTRIN) 600 MG tablet  TAKE 1 TABLET BY MOUTH 4 TIMES DAILY AS NEEDED FOR PAIN             metoclopramide (REGLAN) 10 MG tablet  Take 1 tablet by mouth 3 times daily as needed (nausea)             ondansetron (ZOFRAN) 8 MG tablet  Take 1 tablet by mouth every 8 hours as needed for Nausea or Vomiting                 DC time 37 minutes    Signed:  Juan Manuel Boudreaux  11/5/2020, 3:05 PM

## 2020-11-06 LAB — DOUBLE STRANDED DNA AB, IGG: NORMAL

## 2020-11-07 LAB
PROTEIN C ANTIGEN: 80 % (ref 63–153)
PROTEIN S ANTIGEN, TOTAL: 92 % (ref 63–126)

## 2020-11-08 LAB — ANTITHROMBIN ACTIVITY: 100 % (ref 76–128)

## 2020-11-08 NOTE — PROCEDURES
[address]                      [address], [address] [facility]                          ELECTROENCEPHALOGRAM REPORT    PATIENT NAME: Fermin Mendoza                :        1995  MED REC NO:   [id number]                            ROOM:       [id number]  ACCOUNT NO:   [de-identified]                           ADMIT DATE: 2020  PROVIDER:     Kasi London MD    DATE OF EE2020    EEG FINDINGS:  This is a spontaneous 21-channel EEG recording for this  patient with a stroke with seizure-like episode. The background rhythm  of this EEG shows 8 Hz posterior dominant rhythm of alpha. This is  symmetrical and attenuates with eye opening. EKG is monitored, this is  regular. Photic stimulation was performed without any photic responses. There is no photo response to seizures. Hyperventilation is performed  with good effort. There is some minor right temporal irregularity,  though this does not sustain. This continues throughout the EEG  recording. The patient is diaphoretic. IMPRESSION:  This is an essentially normal EEG recording. Clinical  course recommended.         Mauro Ferguson MD    D: 2020 10:36:45       T: 2020 10:38:46     [email or web address]  Job#: [id number]     Doc#: [id number]    CC:

## 2020-11-09 LAB
DRVVT CONFIRMATION TEST: ABNORMAL RATIO
DRVVT SCREEN: 32 SEC (ref 33–44)
DRVVT,DIL: ABNORMAL SEC (ref 33–44)
HEXAGONAL PHOSPHOLIPID NEUTRALIZAT TEST: ABNORMAL
LUPUS ANTICOAG INTERP: ABNORMAL
PLT NEUTA: ABNORMAL
PT D: 12.6 SEC (ref 12–15.5)
PTT D: 48 SEC (ref 32–48)
PTT-D CORR REFLEX: ABNORMAL SEC (ref 32–48)
PTT-HEPARIN NEUTRALIZED: ABNORMAL SEC (ref 32–48)
REPTILASE TIME: ABNORMAL SEC
THROMBIN TIME: ABNORMAL SEC (ref 14.7–19.5)

## 2020-11-11 ENCOUNTER — OFFICE VISIT (OUTPATIENT)
Dept: FAMILY MEDICINE CLINIC | Age: 25
End: 2020-11-11
Payer: MEDICAID

## 2020-11-11 VITALS
TEMPERATURE: 99.1 F | DIASTOLIC BLOOD PRESSURE: 76 MMHG | SYSTOLIC BLOOD PRESSURE: 113 MMHG | BODY MASS INDEX: 25.87 KG/M2 | HEIGHT: 63 IN | RESPIRATION RATE: 16 BRPM | WEIGHT: 146 LBS | HEART RATE: 91 BPM

## 2020-11-11 PROCEDURE — 1111F DSCHRG MED/CURRENT MED MERGE: CPT | Performed by: NURSE PRACTITIONER

## 2020-11-11 PROCEDURE — G8419 CALC BMI OUT NRM PARAM NOF/U: HCPCS | Performed by: NURSE PRACTITIONER

## 2020-11-11 PROCEDURE — 1036F TOBACCO NON-USER: CPT | Performed by: NURSE PRACTITIONER

## 2020-11-11 PROCEDURE — G8482 FLU IMMUNIZE ORDER/ADMIN: HCPCS | Performed by: NURSE PRACTITIONER

## 2020-11-11 PROCEDURE — 90688 IIV4 VACCINE SPLT 0.5 ML IM: CPT | Performed by: NURSE PRACTITIONER

## 2020-11-11 PROCEDURE — G8427 DOCREV CUR MEDS BY ELIG CLIN: HCPCS | Performed by: NURSE PRACTITIONER

## 2020-11-11 PROCEDURE — 99214 OFFICE O/P EST MOD 30 MIN: CPT | Performed by: NURSE PRACTITIONER

## 2020-11-11 RX ORDER — FOLIC ACID 1 MG/1
1 TABLET ORAL DAILY
Qty: 90 TABLET | Refills: 1 | Status: SHIPPED | OUTPATIENT
Start: 2020-11-11 | End: 2021-03-29

## 2020-11-11 RX ORDER — METHYLPREDNISOLONE 4 MG/1
TABLET ORAL
COMMUNITY
Start: 2020-11-10 | End: 2020-11-16

## 2020-11-11 NOTE — PATIENT INSTRUCTIONS
Mastodon C, and be sure to contact your doctor if you have any problems, or if:    · You do not get better as expected. Where can you learn more? Go to [email or web address] and sign in to your [facility] account. Enter W875 in the Gotuit box to learn more about \"Numbness and Tingling: Care Instructions. \"     If you do not have an account, please click on the \"Sign Up Now\" link. Current as of: November 20, 2019               Content Version: 12.6  © 9977-3533 [employer]. Care instructions adapted under license by [facility] ([facility]). If you have questions about a medical condition or this instruction, always ask your healthcare professional. [address] any warranty or liability for your use of this information.

## 2020-11-11 NOTE — PROGRESS NOTES
[email or web address]    Smokeless tobacco: Never Used   Substance and Sexual Activity    Alcohol use: Yes     Comment: occas    Drug use: No    Sexual activity: Yes     Partners: Male   Lifestyle    Physical activity     Days per week: Not on file     Minutes per session: Not on file    Stress: Not on file   Relationships    Social connections     Talks on phone: Not on file     Gets together: Not on file     Attends Episcopal service: Not on file     Active member of club or organization: Not on file     Attends meetings of clubs or organizations: Not on file     Relationship status: Not on file    Intimate partner violence     Fear of current or ex partner: Not on file     Emotionally abused: Not on file     Physically abused: Not on file     Forced sexual activity: Not on file   Other Topics Concern    Not on file   Social History Narrative    Not on file     Current Outpatient Medications on File Prior to Visit   Medication Sig Dispense Refill    methylPREDNISolone (MEDROL DOSEPACK) 4 MG tablet As Instructed per package      aspirin 81 MG EC tablet Take 1 tablet by mouth daily 30 tablet 3    ibuprofen (ADVIL;MOTRIN) 600 MG tablet TAKE 1 TABLET BY MOUTH 4 TIMES DAILY AS NEEDED FOR PAIN 60 tablet 0    ferrous sulfate 325 (65 Fe) MG tablet Take 1 tablet by mouth 2 times daily 180 tablet 1     No current facility-administered medications on file prior to visit. Allergies:  Patient has no known allergies. Review of Systems   Constitutional: Negative for chills, fatigue and fever. HENT: Negative for congestion, ear pain and sinus pressure. Respiratory: Negative for cough, shortness of breath and wheezing. Cardiovascular: Negative for chest pain, palpitations and leg swelling. Gastrointestinal: Negative for constipation, diarrhea, nausea and vomiting. Neurological: Positive for weakness and numbness. Negative for dizziness, syncope and headaches.    Psychiatric/Behavioral: Negative for self-injury and suicidal ideas. Objective:   /76 (Site: Right Upper Arm, Position: Sitting, Cuff Size: Small Adult)   Pulse 91   Temp 99.1 °F (37.3 °C) (Oral)   Resp 16   Ht 5' 3\" (1.6 m)   Wt 146 lb (66.2 kg)   LMP 10/18/2020   BMI 25.86 kg/m²     Physical Exam  Constitutional:       Appearance: She is well-developed. HENT:      Head: Normocephalic. Right Ear: External ear normal.      Left Ear: External ear normal.      Nose: Nose normal.      Mouth/Throat:      Mouth: Mucous membranes are moist.      Pharynx: Oropharynx is clear. Eyes:      Conjunctiva/sclera: Conjunctivae normal.   Neck:      Musculoskeletal: Normal range of motion. Cardiovascular:      Rate and Rhythm: Normal rate and regular rhythm. Heart sounds: Normal heart sounds. Pulmonary:      Effort: Pulmonary effort is normal.      Breath sounds: Normal breath sounds. Musculoskeletal: Normal range of motion. Skin:     General: Skin is warm and dry. Neurological:      Mental Status: She is alert and oriented to person, place, and time. Cranial Nerves: Cranial nerves are intact. Gait: Gait is intact. Psychiatric:         Mood and Affect: Mood normal.         Behavior: Behavior normal.         Assessment:          Diagnosis Orders   1. Folic acid deficiency  folic acid (FOLVITE) 1 MG tablet   2. Need for influenza vaccination  INFLUENZA, QUADV, 3 YRS AND OLDER, IM, MDV, 0.5ML ([place])   3. Numbness and tingling of right side of face         Plan:      Orders Placed This Encounter   Procedures    INFLUENZA, QUADV, 3 YRS AND OLDER, IM, MDV, 0.5ML (AFLURIA QUADV)          Orders Placed This Encounter   Medications    folic acid (FOLVITE) 1 MG tablet     Sig: Take 1 tablet by mouth daily     Dispense:  90 tablet     Refill:  1       Return in about 6 months (around 5/11/2021), or if symptoms worsen or fail to improve. All labs stable except folic acid.  Supplement sent in. Keep f/u with neurology. Reviewed with the patient: current clinicalstatus, medications, activities and diet. Side effects, adverse effects of the medication prescribedtoday, as well as treatment plan/ rationale and result expectations have been discussedwith the patient who expresses understanding and desires to proceed. Close follow upto evaluate treatment results and for coordination of care. I have reviewedthe patient's medical history in detail and updated the computerized patient record.     Enoch Pinedo, APRN - CNP

## 2020-11-13 LAB — FACTOR V LEIDEN: NEGATIVE

## 2020-11-16 ASSESSMENT — ENCOUNTER SYMPTOMS
DIARRHEA: 0
CONSTIPATION: 0
COUGH: 0
NAUSEA: 0
SHORTNESS OF BREATH: 0
WHEEZING: 0
VOMITING: 0
SINUS PRESSURE: 0

## 2020-11-24 LAB
ANTIPHOSPHOLIPID AB IGG: 0 GPL (ref 0–14)
ANTIPHOSPHOLIPID AB IGM: 17 MPL (ref 0–14)

## 2020-11-24 RX ORDER — BUTALBITAL, ACETAMINOPHEN AND CAFFEINE 50; 325; 40 MG/1; MG/1; MG/1
1 TABLET ORAL EVERY 6 HOURS PRN
Qty: 30 TABLET | Refills: 0 | Status: SHIPPED | OUTPATIENT
Start: 2020-11-24 | End: 2020-11-25 | Stop reason: SDUPTHER

## 2020-11-25 RX ORDER — BUTALBITAL, ACETAMINOPHEN AND CAFFEINE 50; 325; 40 MG/1; MG/1; MG/1
1 TABLET ORAL EVERY 6 HOURS PRN
Qty: 30 TABLET | Refills: 0 | Status: SHIPPED | OUTPATIENT
Start: 2020-11-25 | End: 2021-03-29

## 2020-11-27 ENCOUNTER — VIRTUAL VISIT (OUTPATIENT)
Dept: FAMILY MEDICINE CLINIC | Age: 25
End: 2020-11-27
Payer: COMMERCIAL

## 2020-11-27 PROCEDURE — 1111F DSCHRG MED/CURRENT MED MERGE: CPT | Performed by: NURSE PRACTITIONER

## 2020-11-27 PROCEDURE — G8427 DOCREV CUR MEDS BY ELIG CLIN: HCPCS | Performed by: NURSE PRACTITIONER

## 2020-11-27 PROCEDURE — 99213 OFFICE O/P EST LOW 20 MIN: CPT | Performed by: NURSE PRACTITIONER

## 2020-11-27 PROCEDURE — G8419 CALC BMI OUT NRM PARAM NOF/U: HCPCS | Performed by: NURSE PRACTITIONER

## 2020-11-27 PROCEDURE — G8482 FLU IMMUNIZE ORDER/ADMIN: HCPCS | Performed by: NURSE PRACTITIONER

## 2020-11-27 PROCEDURE — 1036F TOBACCO NON-USER: CPT | Performed by: NURSE PRACTITIONER

## 2020-11-27 RX ORDER — BENZONATATE 200 MG/1
200 CAPSULE ORAL 3 TIMES DAILY PRN
Qty: 30 CAPSULE | Refills: 0 | Status: SHIPPED | OUTPATIENT
Start: 2020-11-27 | End: 2020-12-04

## 2020-11-27 RX ORDER — AZITHROMYCIN 250 MG/1
250 TABLET, FILM COATED ORAL SEE ADMIN INSTRUCTIONS
Qty: 6 TABLET | Refills: 0 | Status: SHIPPED | OUTPATIENT
Start: 2020-11-27 | End: 2020-12-02

## 2020-11-27 RX ORDER — CETIRIZINE HYDROCHLORIDE, PSEUDOEPHEDRINE HYDROCHLORIDE 5; 120 MG/1; MG/1
1 TABLET, FILM COATED, EXTENDED RELEASE ORAL 2 TIMES DAILY
Qty: 30 TABLET | Refills: 0 | Status: SHIPPED | OUTPATIENT
Start: 2020-11-27 | End: 2020-12-12

## 2020-11-27 ASSESSMENT — ENCOUNTER SYMPTOMS
SHORTNESS OF BREATH: 0
RHINORRHEA: 1
NAUSEA: 0
COUGH: 1
WHEEZING: 0
VOMITING: 0
SORE THROAT: 1
DIARRHEA: 0

## 2020-11-27 NOTE — PROGRESS NOTES
TELEHEALTH EVALUATION -- Audio/Visual (During [facility]-85 public health emergency)    -   Salvador Guzman is a 23 y.o. female being evaluated by a Virtual Visit (video visit) encounter to address concerns as mentioned above. A caregiver was present when appropriate. Due to this being a TeleHealth encounter (During [facility]-30 public health emergency), evaluation of the following organ systems was limited: Vitals/Constitutional/EENT/Resp/CV/GI//MS/Neuro/Skin/Heme-Lymph-Imm. Pursuant to the emergency declaration under the [facility], [address] [place] [address] authority and the [employer] Act, this Virtual Visit was conducted with patient's (and/or legal guardian's) consent, to reduce the patient's risk of exposure to COVID-19 and provide necessary medical care. The patient (and/or legal guardian) has also been advised to contact this office for worsening conditions or problems, and seek emergency medical treatment and/or call 911 if deemed necessary. Patient was contacted and agreed to proceed with a virtual visit via [email or web address] me  The risks and benefits of converting to a virtual visit were discussed in light of the current infectious disease epidemic. Patient also understood that insurance coverage and co-pays are up to their individual insurance plans. Patient was located at their home. Provider was located at their office. 2020  Salvador Guzman (:  1995) has requested an audio/video evaluation for the following concern(s):    HPI    Kids have had a cold  They are stuffed up  Now she has it  Started last  Night  Sore throat  Cough  Postnasal drip  She does not want covid tested   Been taking tylenol at home   Pt wants a steroid shot because she got that in May the last time for the same thing . I do not see the administration of a steroid shot.  Asked her if it was because she could have had allergies at the time and she does admit it could have been that. Review of Systems   Constitutional: Negative for chills, fatigue and fever. HENT: Positive for postnasal drip, rhinorrhea and sore throat. Negative for congestion. Respiratory: Positive for cough. Negative for shortness of breath and wheezing. Gastrointestinal: Negative for diarrhea, nausea and vomiting. Musculoskeletal: Negative for myalgias. Neurological: Negative for headaches. Prior to Visit Medications    Medication Sig Taking? Authorizing Provider   cetirizine-psuedoephedrine (ZYRTEC-D) 5-120 MG per extended release tablet Take 1 tablet by mouth 2 times daily for 15 days Yes ELÍAS Rhodes CNP   benzonatate (TESSALON) 200 MG capsule Take 1 capsule by mouth 3 times daily as needed for Cough Yes ELÍAS Rhodes CNP   azithromycin (ZITHROMAX) 250 MG tablet Take 1 tablet by mouth See Admin Instructions for 5 days 500mg on day 1 followed by 250mg on days 2 - 5 Yes ELÍAS Dugan - CNP   butalbital-acetaminophen-caffeine (FIORICET, ESGIC) -40 MG per tablet Take 1 tablet by mouth every 6 hours as needed for Headaches Do not take no more than 6 tablets in 24 hours  Joselin Weinberg DO   folic acid (FOLVITE) 1 MG tablet Take 1 tablet by mouth daily  ELÍAS Malhotra - CNP   aspirin 81 MG EC tablet Take 1 tablet by mouth daily  Beatris Ervin DO   ibuprofen (ADVIL;MOTRIN) 600 MG tablet TAKE 1 TABLET BY MOUTH 4 TIMES DAILY AS NEEDED FOR PAIN  Ramin Herzog,    ferrous sulfate 325 (65 Fe) MG tablet Take 1 tablet by mouth 2 times daily  Joselin Weinberg DO       Past Medical History:   Diagnosis Date    Anemia     Anxiety     Depression      No past surgical history on file.   Social History     Socioeconomic History    Marital status: Single     Spouse name: Not on file    Number of children: Not on file    Years of education: Not on file    Highest education level: Not on file   Occupational History    Not on file   Social Needs    Financial resource strain: Not on file    Food insecurity     Worry: Not on file     Inability: Not on file    Transportation needs     Medical: Not on file     Non-medical: Not on file   Tobacco Use    Smoking status: Former Smoker     Packs/day: 0.25     Years: 2.00     Pack years: 0.50     Last attempt to quit: 2018     Years since quittin.0    Smokeless tobacco: Never Used   Substance and Sexual Activity    Alcohol use: Yes     Comment: occas    Drug use: No    Sexual activity: Yes     Partners: Male   Lifestyle    Physical activity     Days per week: Not on file     Minutes per session: Not on file    Stress: Not on file   Relationships    Social connections     Talks on phone: Not on file     Gets together: Not on file     Attends Zoroastrianism service: Not on file     Active member of club or organization: Not on file     Attends meetings of clubs or organizations: Not on file     Relationship status: Not on file    Intimate partner violence     Fear of current or ex partner: Not on file     Emotionally abused: Not on file     Physically abused: Not on file     Forced sexual activity: Not on file   Other Topics Concern    Not on file   Social History Narrative    Not on file     Family History   Problem Relation Age of Onset    Diabetes Paternal Grandfather     Diabetes Father      No Known Allergies    PMH, Surgical Hx, Family Hx, and Social Hx reviewed and updated. Health Maintenance reviewed.     PHYSICAL EXAMINATION:  \"[x]\" Indicates a positive item  \"[]\" Indicates a negative item    Vital Signs: (As obtained by patient/caregiver or practitioner observation)    Blood pressure-  Heart rate-    Respiratory rate-    Temperature-96.6  Pulse oximetry-     Constitutional: [x] Appears well-developed and well-nourished [x] No apparent distress      [] Abnormal-   Mental status  [x] Alert and awake [x] Oriented to person/place/time [x]Able to follow commands      Eyes:  EOM    []  Normal  [] Abnormal-  Sclera  [x]  Normal  [] Abnormal -         Discharge [x]  None visible  [] Abnormal -    HENT:   [x] Normocephalic, atraumatic.   [] Abnormal   [x] Mouth/Throat: Mucous membranes are moist.     External Ears [x] Normal  [] Abnormal-     Neck: [x] No visualized mass     Pulmonary/Chest: [x] Respiratory effort normal.  [x] No visualized signs of difficulty breathing or respiratory distress        [] Abnormal-      Musculoskeletal:   [] Normal gait with no signs of ataxia         [x] Normal range of motion of neck        [] Abnormal-       Neurological:       [x] No Facial Asymmetry (Cranial nerve 7 motor function) (limited exam to video visit)          [x] No gaze palsy        [] Abnormal-         Skin:        [x] No significant exanthematous lesions or discoloration noted on facial skin         [] Abnormal-            Psychiatric:       [x] Normal Affect [x] No Hallucinations        [] Abnormal-     Other pertinent observable physical exam findings-   Results for orders placed or performed during the hospital encounter of 11/03/20   Comprehensive Metabolic Panel   Result Value Ref Range    Sodium 133 (L) 135 - 144 mEq/L    Potassium 3.8 3.4 - 4.9 mEq/L    Chloride 100 95 - 107 mEq/L    CO2 24 20 - 31 mEq/L    Anion Gap 9 9 - 15 mEq/L    Glucose 87 70 - 99 mg/dL    BUN 13 6 - 20 mg/dL    CREATININE 0.76 0.50 - 0.90 mg/dL    GFR Non-African American >60.0 >60    GFR  >60.0 >60    Calcium 8.4 (L) 8.5 - 9.9 mg/dL    Total Protein 7.1 6.3 - 8.0 g/dL    Alb 4.2 3.5 - 4.6 g/dL    Total Bilirubin 0.4 0.2 - 0.7 mg/dL    Alkaline Phosphatase 49 40 - 130 U/L    ALT 9 0 - 33 U/L    AST 14 0 - 35 U/L    Globulin 2.9 2.3 - 3.5 g/dL   CBC Auto Differential   Result Value Ref Range    WBC 8.3 4.8 - 10.8 K/uL    RBC 4.25 4.20 - 5.40 M/uL    Hemoglobin 12.3 12.0 - 16.0 g/dL    Hematocrit 36.7 (L) 37.0 - 47.0 % MCV 86.2 82.0 - 100.0 fL    MCH 28.8 27.0 - 31.3 pg    MCHC 33.4 33.0 - 37.0 %    RDW 12.8 11.5 - 14.5 %    Platelets 525 909 - 258 K/uL    Neutrophils % 64.2 %    Lymphocytes % 28.8 %    Monocytes % 6.1 %    Eosinophils % 0.4 %    Basophils % 0.5 %    Neutrophils Absolute 5.3 1.4 - 6.5 K/uL    Lymphocytes Absolute 2.4 1.0 - 4.8 K/uL    Monocytes Absolute 0.5 0.2 - 0.8 K/uL    Eosinophils Absolute 0.0 0.0 - 0.7 K/uL    Basophils Absolute 0.0 0.0 - 0.2 K/uL   Protime-INR   Result Value Ref Range    Protime 13.4 12.3 - 14.9 sec    INR 1.0    APTT   Result Value Ref Range    aPTT 32.6 24.4 - 36.8 sec   Magnesium   Result Value Ref Range    Magnesium 2.0 1.7 - 2.4 mg/dL   Troponin   Result Value Ref Range    Troponin <0.010 0.000 - 0.010 ng/mL   Urine Reflex to Culture    Specimen: Urine, clean catch   Result Value Ref Range    Color, UA Yellow Straw/Yellow    Clarity, UA Clear Clear    Glucose, Ur Negative Negative mg/dL    Bilirubin Urine Negative Negative    Ketones, Urine Negative Negative mg/dL    Specific Gravity, UA 1.067 1.005 - 1.030    Blood, Urine Negative Negative    pH, UA 6.5 5.0 - 9.0    Protein, UA Negative Negative mg/dL    Urobilinogen, Urine 0.2 <2.0 E.U./dL    Nitrite, Urine Negative Negative    Leukocyte Esterase, Urine Negative Negative    Urine Reflex to Culture Not Indicated    Urine Drug Screen   Result Value Ref Range    Amphetamine Screen, Urine Neg Negative <1000 ng/mL    Barbiturate Screen, Ur Neg Negative < 200 ng/mL    Benzodiazepine Screen, Urine Neg Negative < 200 ng/mL    Cannabinoid Scrn, Ur Neg Negative < 50 ng/mL    Cocaine Metabolite Screen, Urine Neg Negative < 300 ng/mL    Opiate Scrn, Ur Neg Negative < 300 ng/mL    PCP Screen, Urine Neg Negative < 25 ng/mL    Methadone Screen, Urine Neg Negative <300 ng/mL    Propoxyphene Scrn, Ur Neg Negative <300 ng/mL    Oxycodone Urine Neg Negative <100 ng/mL    Drug Screen Comment: see below    Ethanol   Result Value Ref Range    Ethanol Lvl <10 mg/dL    Ethanol percent Not indicated G/dL   RPR Reflex to Titer and TPPA   Result Value Ref Range    RPR Non-reactive Non-reactive   LYME DISEASE ACUTE REFLEXIVE PANEL   Result Value Ref Range    Lyme, EIA 0.55 0.00 - 1.20 JACQUELINE   TSH without Reflex   Result Value Ref Range    TSH 2.730 0.440 - 3.860 uIU/mL   Vitamin B12 & Folate   Result Value Ref Range    Vitamin B-12 341 232 - 1245 pg/mL    Folate 6.1 (L) 7.3 - 26.1 ng/mL   Hemoglobin A1c   Result Value Ref Range    Hemoglobin A1C 4.9 4.8 - 5.9 %   Lipid panel - fasting   Result Value Ref Range    Cholesterol, Total 124 0 - 199 mg/dL    Triglycerides 61 0 - 150 mg/dL    HDL 50 40 - 59 mg/dL    LDL Calculated 62 0 - 129 mg/dL   CBC   Result Value Ref Range    WBC 7.3 4.8 - 10.8 K/uL    RBC 3.97 (L) 4.20 - 5.40 M/uL    Hemoglobin 11.7 (L) 12.0 - 16.0 g/dL    Hematocrit 35.0 (L) 37.0 - 47.0 %    MCV 88.1 82.0 - 100.0 fL    MCH 29.5 27.0 - 31.3 pg    MCHC 33.5 33.0 - 37.0 %    RDW 13.0 11.5 - 14.5 %    Platelets 131 203 - 650 K/uL   Basic Metabolic Panel w/ Reflex to MG   Result Value Ref Range    Sodium 141 135 - 144 mEq/L    Potassium reflex Magnesium 4.2 3.4 - 4.9 mEq/L    Chloride 108 (H) 95 - 107 mEq/L    CO2 24 20 - 31 mEq/L    Anion Gap 9 9 - 15 mEq/L    Glucose 87 70 - 99 mg/dL    BUN 17 6 - 20 mg/dL    CREATININE 0.69 0.50 - 0.90 mg/dL    GFR Non-African American >60.0 >60    GFR  >60.0 >60    Calcium 8.3 (L) 8.5 - 9.9 mg/dL   Antiphospholipid Antibody Panel   Result Value Ref Range    Antiphospholipid Ab IgG 0 0 - 14 GPL    Antiphospholipid Ab IgM 17 (H) 0 - 14 MPL   Protein C Antigen, Total   Result Value Ref Range    Protein C Antigen 80 63 - 153 %   Protein S Antigen, Total   Result Value Ref Range    Protein S Ag, Total 92 63 - 126 %   Antithrombin III   Result Value Ref Range    Antithrombin Activity 100 76 - 128 %   Homocysteine, Serum   Result Value Ref Range    Homocysteine 7.9 0.0 - 15.0 umol/L   Lupus Anticoagulant   Result Value Ref Range    PTT D 48 32 - 48 sec    PT D 12.6 12.0 - 15.5 sec    Thrombin Time Not Applicable 64.5 - 26.5 sec    Reptilase Tm Not Applicable <=19.5 sec    Ptt-D Lorelei Reflex Not Applicable 32 - 48 sec    dRVVT Screen 32 (L) 33 - 44 sec    DRVVT,DIL Not Applicable 33 - 44 sec    Lup Anticoag Interp See Note     Hex Phosph Neut Test Not Applicable Negative    Ptt-Heparin Neutralized Not Applicable 32 - 48 sec    PLT NEUTA Not Applicable Negative    DRVVT Confirmation Test Not Applicable Negative ratio   Anti-DNA Antibody, Double-Stranded   Result Value Ref Range    Double Stranded Dna Ab, Igg None Detected None Detected   FACTOR 5 LEIDEN   Result Value Ref Range    Factor V Leiden Negative    POC Pregnancy Urine Qual   Result Value Ref Range    HCG, Urine, POC Negative Negative    Lot Number [id number]     Positive QC Pass/Fail Pass     Negative QC Pass/Fail Pass    POCT CREATININE   Result Value Ref Range    POC CREATININE WHOLE BLOOD 0.7    POCT Glucose   Result Value Ref Range    POC Glucose 95 60 - 115 mg/dl    Performed on ACCU-CHEK    POCT Venous   Result Value Ref Range    POC Creatinine 0.7 0.6 - 1.1 mg/dL    GFR Non-African American >60 >60    GFR  >60 >60    Sample Type KEEGAN     Performed on SEE BELOW    EKG 12 Lead   Result Value Ref Range    Ventricular Rate 73 BPM    Atrial Rate 73 BPM    P-R Interval 160 ms    QRS Duration 78 ms    Q-T Interval 368 ms    QTc Calculation (Bazett) 405 ms    P Axis 30 degrees    R Axis 28 degrees    T Axis 33 degrees   Echocardiogram complete 2D with doppler with color   Result Value Ref Range    Left Ventricular Ejection Fraction 36     LVEF MODALITY ECHO    ECHO Charly With Bubble Study   Result Value Ref Range    Left Ventricular Ejection Fraction 55     LVEF MODALITY ECHO        ASSESSMENT/PLAN: Explained a steroid shot is not going to help this URI and to treat herself symptomatically. Will call in an antibiotic to start after watchful waiting.  People understood. Assessment & Plan   Diagnoses and all orders for this visit:    Viral URI  -     cetirizine-psuedoephedrine (ZYRTEC-D) 5-120 MG per extended release tablet; Take 1 tablet by mouth 2 times daily for 15 days  -     benzonatate (TESSALON) 200 MG capsule; Take 1 capsule by mouth 3 times daily as needed for Cough  -     azithromycin (ZITHROMAX) 250 MG tablet; Take 1 tablet by mouth See Admin Instructions for 5 days 500mg on day 1 followed by 250mg on days 2 - 5      No orders of the defined types were placed in this encounter. Orders Placed This Encounter   Medications    cetirizine-psuedoephedrine (ZYRTEC-D) 5-120 MG per extended release tablet     Sig: Take 1 tablet by mouth 2 times daily for 15 days     Dispense:  30 tablet     Refill:  0    benzonatate (TESSALON) 200 MG capsule     Sig: Take 1 capsule by mouth 3 times daily as needed for Cough     Dispense:  30 capsule     Refill:  0    azithromycin (ZITHROMAX) 250 MG tablet     Sig: Take 1 tablet by mouth See Admin Instructions for 5 days 500mg on day 1 followed by 250mg on days 2 - 5     Dispense:  6 tablet     Refill:  0     There are no discontinued medications. Return if symptoms worsen or fail to improve. Reviewed with the patient: current clinical status, medications, activities and diet. Side effects, adverse effects of the medication prescribed today, as well as treatment plan/ rationale and result expectations have been discussed with the patient who expresses understanding and desires to proceed. Close follow up to evaluate treatment results and for coordination of care. I have reviewed the patient's medical history in detail and updated the computerized patient record.      Patient identification was verified at the start of the visit: Yes    Total time spent on this encounter: Not billed by time      --ELÍAS Stover CNP on 11/27/2020 at 7:01 PM    An electronic signature was used to authenticate this note.

## 2020-11-27 NOTE — PATIENT INSTRUCTIONS
Patient Education        Upper Respiratory Infection (Cold): Care Instructions  Your Care Instructions     An upper respiratory infection, or URI, is an infection of the nose, sinuses, or throat. URIs are spread by coughs, sneezes, and direct contact. The common cold is the most frequent kind of URI. The flu and sinus infections are other kinds of URIs. Almost all URIs are caused by viruses. Antibiotics won't cure them. But you can treat most infections with home care. This may include drinking lots of fluids and taking over-the-counter pain medicine. You will probably feel better in 4 to 10 days. The doctor has checked you carefully, but problems can develop later. If you notice any problems or new symptoms, get medical treatment right away. Follow-up care is a key part of your treatment and safety. Be sure to make and go to all appointments, and call your doctor if you are having problems. It's also a good idea to know your test results and keep a list of the medicines you take. How can you care for yourself at home? · To prevent dehydration, drink plenty of fluids, enough so that your urine is light yellow or clear like water. Choose water and other caffeine-free clear liquids until you feel better. If you have kidney, heart, or liver disease and have to limit fluids, talk with your doctor before you increase the amount of fluids you drink. · Take an over-the-counter pain medicine, such as acetaminophen (Tylenol), ibuprofen (Advil, Motrin), or naproxen (Aleve). Read and follow all instructions on the label. · Before you use cough and cold medicines, check the label. These medicines may not be safe for young children or for people with certain health problems. · Be careful when taking over-the-counter cold or flu medicines and Tylenol at the same time. Many of these medicines have acetaminophen, which is Tylenol. Read the labels to make sure that you are not taking more than the recommended dose.  Too much acetaminophen (Tylenol) can be harmful. · Get plenty of rest.  · Do not smoke or allow others to smoke around you. If you need help quitting, talk to your doctor about stop-smoking programs and medicines. These can increase your chances of quitting for good. When should you call for help? Call 911 anytime you think you may need emergency care. For example, call if:    · You have severe trouble breathing. Call your doctor now or seek immediate medical care if:    · You seem to be getting much sicker.     · You have new or worse trouble breathing.     · You have a new or higher fever.     · You have a new rash. Watch closely for changes in your health, and be sure to contact your doctor if:    · You have a new symptom, such as a sore throat, an earache, or sinus pain.     · You cough more deeply or more often, especially if you notice more mucus or a change in the color of your mucus.     · You do not get better as expected. Where can you learn more? Go to [email or web address]://[employer]. org and sign in to your [employer] account. Enter F684 in the [employer]med box to learn more about \"Upper Respiratory Infection (Cold): Care Instructions. \"     If you do not have an account, please click on the \"Sign Up Now\" link. Current as of: February 24, 2020               Content Version: 12.6  © 5762-9094 [employer]. Care instructions adapted under license by [facility] ([facility]). If you have questions about a medical condition or this instruction, always ask your healthcare professional. [place] [address] any warranty or liability for your use of this information.

## 2020-12-02 ENCOUNTER — TELEPHONE (OUTPATIENT)
Dept: FAMILY MEDICINE CLINIC | Age: 25
End: 2020-12-02

## 2020-12-02 NOTE — TELEPHONE ENCOUNTER
Patient called requesting a return to work note for unemployment. Patient states that she would like it to be dated 11/11/2020 when her last OV was.

## 2021-03-29 ENCOUNTER — OFFICE VISIT (OUTPATIENT)
Dept: FAMILY MEDICINE CLINIC | Age: 26
End: 2021-03-29
Payer: COMMERCIAL

## 2021-03-29 VITALS
RESPIRATION RATE: 18 BRPM | WEIGHT: 142 LBS | HEIGHT: 63 IN | TEMPERATURE: 97 F | DIASTOLIC BLOOD PRESSURE: 60 MMHG | OXYGEN SATURATION: 99 % | HEART RATE: 79 BPM | SYSTOLIC BLOOD PRESSURE: 120 MMHG | BODY MASS INDEX: 25.16 KG/M2

## 2021-03-29 DIAGNOSIS — F31.9 BIPOLAR AFFECTIVE DISORDER, REMISSION STATUS UNSPECIFIED (HCC): Primary | ICD-10-CM

## 2021-03-29 DIAGNOSIS — F31.9 BIPOLAR AFFECTIVE DISORDER, REMISSION STATUS UNSPECIFIED (HCC): ICD-10-CM

## 2021-03-29 LAB
T4 FREE: 1.25 NG/DL (ref 0.84–1.68)
TSH REFLEX: 1.08 UIU/ML (ref 0.44–3.86)

## 2021-03-29 PROCEDURE — 1036F TOBACCO NON-USER: CPT | Performed by: INTERNAL MEDICINE

## 2021-03-29 PROCEDURE — G8482 FLU IMMUNIZE ORDER/ADMIN: HCPCS | Performed by: INTERNAL MEDICINE

## 2021-03-29 PROCEDURE — G8419 CALC BMI OUT NRM PARAM NOF/U: HCPCS | Performed by: INTERNAL MEDICINE

## 2021-03-29 PROCEDURE — 99213 OFFICE O/P EST LOW 20 MIN: CPT | Performed by: INTERNAL MEDICINE

## 2021-03-29 PROCEDURE — G8427 DOCREV CUR MEDS BY ELIG CLIN: HCPCS | Performed by: INTERNAL MEDICINE

## 2021-03-29 RX ORDER — QUETIAPINE FUMARATE 50 MG/1
TABLET, EXTENDED RELEASE ORAL
Qty: 14 TABLET | Refills: 0 | Status: SHIPPED | OUTPATIENT
Start: 2021-03-29 | End: 2021-04-06

## 2021-03-29 RX ORDER — QUETIAPINE FUMARATE 300 MG/1
300 TABLET, FILM COATED ORAL EVERY EVENING
Qty: 60 TABLET | Refills: 3 | Status: SHIPPED | OUTPATIENT
Start: 2021-03-29 | End: 2021-04-06

## 2021-03-29 ASSESSMENT — COLUMBIA-SUICIDE SEVERITY RATING SCALE - C-SSRS
2. HAVE YOU ACTUALLY HAD ANY THOUGHTS OF KILLING YOURSELF?: NO
6. HAVE YOU EVER DONE ANYTHING, STARTED TO DO ANYTHING, OR PREPARED TO DO ANYTHING TO END YOUR LIFE?: NO

## 2021-03-29 ASSESSMENT — ENCOUNTER SYMPTOMS
RHINORRHEA: 0
EYE DISCHARGE: 0
FACIAL SWELLING: 0
VOICE CHANGE: 0
NAUSEA: 0
DIARRHEA: 0
ABDOMINAL PAIN: 0
COLOR CHANGE: 0
PHOTOPHOBIA: 0
EYE ITCHING: 0
BLOOD IN STOOL: 0
ABDOMINAL DISTENTION: 0
VOMITING: 0
SORE THROAT: 0
TROUBLE SWALLOWING: 0
RECTAL PAIN: 0
CHEST TIGHTNESS: 0
EYE REDNESS: 0
APNEA: 0
SHORTNESS OF BREATH: 0
EYE PAIN: 0
CONSTIPATION: 0
WHEEZING: 0
SINUS PRESSURE: 0
COUGH: 0
BACK PAIN: 0
SINUS PAIN: 0

## 2021-03-29 ASSESSMENT — PATIENT HEALTH QUESTIONNAIRE - PHQ9
4. FEELING TIRED OR HAVING LITTLE ENERGY: 3
10. IF YOU CHECKED OFF ANY PROBLEMS, HOW DIFFICULT HAVE THESE PROBLEMS MADE IT FOR YOU TO DO YOUR WORK, TAKE CARE OF THINGS AT HOME, OR GET ALONG WITH OTHER PEOPLE: 3
SUM OF ALL RESPONSES TO PHQ9 QUESTIONS 1 & 2: 6
8. MOVING OR SPEAKING SO SLOWLY THAT OTHER PEOPLE COULD HAVE NOTICED. OR THE OPPOSITE, BEING SO FIGETY OR RESTLESS THAT YOU HAVE BEEN MOVING AROUND A LOT MORE THAN USUAL: 0
9. THOUGHTS THAT YOU WOULD BE BETTER OFF DEAD, OR OF HURTING YOURSELF: 0
1. LITTLE INTEREST OR PLEASURE IN DOING THINGS: 3

## 2021-03-29 NOTE — PROGRESS NOTES
Subjective:      Patient ID: Holly Marmolejo is a 27 y.o. female Established patient, here for evaluation of the following chief complaint(s):  Chief Complaint   Patient presents with    Depression     patient states she used to be on venlafaxine and she stopped because she didn't like the way it made her feel. She is requesting some type of testing  she states she feels she has more than just depression       HPI    27 yr old monroe with depression presents following a manic episode during which she spent $800.00. At present he denies polyuria,  Polydipsia, constitutional, sinus, visual, cardiopulmonary, urologic, gastrointestinal, immunologic/hematologic, musculoskeletal, neurologic,dermatologic, or psychiatric complaints. Review of Systems   Constitutional: Negative for chills, diaphoresis, fatigue and fever. HENT: Negative for congestion, dental problem, drooling, ear discharge, ear pain, facial swelling, hearing loss, mouth sores, nosebleeds, postnasal drip, rhinorrhea, sinus pressure, sinus pain, sneezing, sore throat, tinnitus, trouble swallowing and voice change. Eyes: Negative for photophobia, pain, discharge, redness, itching and visual disturbance. Respiratory: Negative for apnea, cough, chest tightness, shortness of breath and wheezing. Cardiovascular: Negative for chest pain, palpitations and leg swelling. Gastrointestinal: Negative for abdominal distention, abdominal pain, blood in stool, constipation, diarrhea, nausea, rectal pain and vomiting. Endocrine: Negative for cold intolerance, heat intolerance, polydipsia, polyphagia and polyuria. Genitourinary: Negative for decreased urine volume, difficulty urinating, dysuria, flank pain, frequency, genital sores, hematuria and urgency. Musculoskeletal: Negative for arthralgias, back pain, gait problem, joint swelling, myalgias, neck pain and neck stiffness. Skin: Negative for color change, rash and wound. Allergic/Immunologic: Negative for environmental allergies and food allergies. Neurological: Negative for dizziness, tremors, seizures, syncope, facial asymmetry, speech difficulty, weakness, light-headedness, numbness and headaches. Hematological: Negative for adenopathy. Does not bruise/bleed easily. Psychiatric/Behavioral: Negative for agitation, confusion, decreased concentration, hallucinations, self-injury, sleep disturbance and suicidal ideas. The patient is not nervous/anxious. Objective:   /60   Pulse 79   Temp 97 °F (36.1 °C)   Resp 18   Ht 5' 3\" (1.6 m)   Wt 142 lb (64.4 kg)   SpO2 99%   BMI 25.15 kg/m²     Physical Exam  Constitutional:       General: She is not in acute distress. Appearance: She is well-developed. HENT:      Head: Normocephalic. Right Ear: External ear normal.      Left Ear: External ear normal.   Eyes:      Conjunctiva/sclera: Conjunctivae normal.   Neck:      Musculoskeletal: Neck supple. Vascular: No JVD. Trachea: No tracheal deviation. Cardiovascular:      Rate and Rhythm: Normal rate and regular rhythm. Heart sounds: Normal heart sounds. Pulmonary:      Effort: Pulmonary effort is normal. No respiratory distress. Breath sounds: Normal breath sounds. No wheezing or rales. Chest:      Chest wall: No tenderness. Abdominal:      General: Bowel sounds are normal. There is no distension. Palpations: Abdomen is soft. There is no mass. Tenderness: There is no abdominal tenderness. There is no guarding or rebound. Musculoskeletal:         General: No tenderness or deformity. Skin:     General: Skin is warm and dry. Coloration: Skin is not pale. Findings: No erythema or rash. Neurological:      Mental Status: She is alert and oriented to person, place, and time. Motor: No abnormal muscle tone. Deep Tendon Reflexes: Reflexes normal.   Psychiatric:         Thought Content:  Thought content normal.         Judgment: Judgment normal.         Assessment:       Diagnosis Orders   1. [facility] affective disorder, [facility]  TSH with Reflex    T4, Free         Plan:      Monica Bal was seen today for depression. Diagnoses and all orders for this visit:    Bipolar affective disorder, remission status unspecified ([facility]  -     TSH with Reflex; Future  -     T4, Free; Future    Other orders  -     QUEtiapine (SEROQUEL XR) 50 MG extended release tablet; Take 1 tablet po on day,  then 2 tabs po qhs on day 2, then  4 tabs  po qhs on day 3, then 6 tabs po qhs on day 4.  -     QUEtiapine (SEROQUEL) 300 MG tablet; Take 1 tablet by mouth every evening  -     Win Acosta MD, [place]         Return in about 1 week (around 4/5/2021). On this date 03/29/21 I have spent 30 minutes reviewing previous notes, test results and face to face with the patient discussing the diagnosis and importance of compliance with the treatment plan. Sharee Chatterjee MD    Please note, this report has been partially produced using speech recognition software  and may cause  and /or contain errors related to that system including grammar, punctuation and spelling as well as words and phrases that may seem inappropriate. If there are questions or concerns please feel free to contact me to clarify.

## 2021-04-05 ENCOUNTER — TELEPHONE (OUTPATIENT)
Dept: FAMILY MEDICINE CLINIC | Age: 26
End: 2021-04-05

## 2021-04-06 ENCOUNTER — VIRTUAL VISIT (OUTPATIENT)
Dept: FAMILY MEDICINE CLINIC | Age: 26
End: 2021-04-06
Payer: COMMERCIAL

## 2021-04-06 DIAGNOSIS — F31.9 BIPOLAR AFFECTIVE DISORDER, REMISSION STATUS UNSPECIFIED (HCC): Primary | ICD-10-CM

## 2021-04-06 PROCEDURE — 1036F TOBACCO NON-USER: CPT | Performed by: INTERNAL MEDICINE

## 2021-04-06 PROCEDURE — 99213 OFFICE O/P EST LOW 20 MIN: CPT | Performed by: INTERNAL MEDICINE

## 2021-04-06 PROCEDURE — G8427 DOCREV CUR MEDS BY ELIG CLIN: HCPCS | Performed by: INTERNAL MEDICINE

## 2021-04-06 PROCEDURE — G8419 CALC BMI OUT NRM PARAM NOF/U: HCPCS | Performed by: INTERNAL MEDICINE

## 2021-04-06 RX ORDER — QUETIAPINE FUMARATE 100 MG/1
100 TABLET, FILM COATED ORAL EVERY EVENING
Qty: 7 TABLET | Refills: 3 | Status: SHIPPED | OUTPATIENT
Start: 2021-04-06 | End: 2021-05-24 | Stop reason: SDUPTHER

## 2021-04-06 ASSESSMENT — ENCOUNTER SYMPTOMS
EYE DISCHARGE: 0
WHEEZING: 0
RECTAL PAIN: 0
ABDOMINAL DISTENTION: 0
SORE THROAT: 0
RHINORRHEA: 0
SINUS PAIN: 0
CHEST TIGHTNESS: 0
CONSTIPATION: 0
SINUS PRESSURE: 0
COUGH: 0
EYE PAIN: 0
BACK PAIN: 0
VOICE CHANGE: 0
TROUBLE SWALLOWING: 0
EYE REDNESS: 0
COLOR CHANGE: 0
DIARRHEA: 0
EYE ITCHING: 0
FACIAL SWELLING: 0
NAUSEA: 0
SHORTNESS OF BREATH: 0
ABDOMINAL PAIN: 0
APNEA: 0
PHOTOPHOBIA: 0
BLOOD IN STOOL: 0
VOMITING: 0

## 2021-04-06 NOTE — PROGRESS NOTES
Subjective:      Patient ID: Obed Colmenares is a 27 y.o. female Established patient, here for evaluation of the following chief complaint(s):  Chief Complaint   Patient presents with    Manic Behavior     pt states that she was ordered seroquel but she had to stop taking it because she couldnt function       HPI    27 yr old fenramos with bipolar disorder presents for follow-up visit. During her prior visit the patient was placed on Seroquel. However she states that the Seroquel is making her exceedingly somnolent. At present he denies polyuria,  Polydipsia, constitutional, sinus, visual, cardiopulmonary, urologic, gastrointestinal, immunologic/hematologic, musculoskeletal, neurologic,dermatologic, or psychiatric complaints. Review of Systems   Constitutional: Negative for chills, diaphoresis, fatigue and fever. HENT: Negative for congestion, dental problem, drooling, ear discharge, ear pain, facial swelling, hearing loss, mouth sores, nosebleeds, postnasal drip, rhinorrhea, sinus pressure, sinus pain, sneezing, sore throat, tinnitus, trouble swallowing and voice change. Eyes: Negative for photophobia, pain, discharge, redness, itching and visual disturbance. Respiratory: Negative for apnea, cough, chest tightness, shortness of breath and wheezing. Cardiovascular: Negative for chest pain, palpitations and leg swelling. Gastrointestinal: Negative for abdominal distention, abdominal pain, blood in stool, constipation, diarrhea, nausea, rectal pain and vomiting. Endocrine: Negative for cold intolerance, heat intolerance, polydipsia, polyphagia and polyuria. Genitourinary: Negative for decreased urine volume, difficulty urinating, dysuria, flank pain, frequency, genital sores, hematuria and urgency. Musculoskeletal: Negative for arthralgias, back pain, gait problem, joint swelling, myalgias, neck pain and neck stiffness. Skin: Negative for color change, rash and wound.

## 2021-04-12 RX ORDER — DIVALPROEX SODIUM 250 MG/1
TABLET, DELAYED RELEASE ORAL
Qty: 90 TABLET | Refills: 3 | Status: SHIPPED | OUTPATIENT
Start: 2021-04-12 | End: 2021-08-16 | Stop reason: SDUPTHER

## 2021-04-12 RX ORDER — DIVALPROEX SODIUM 500 MG/1
TABLET, DELAYED RELEASE ORAL
Qty: 90 TABLET | Refills: 3 | Status: SHIPPED | OUTPATIENT
Start: 2021-04-12

## 2021-05-20 RX ORDER — QUETIAPINE FUMARATE 300 MG/1
TABLET, FILM COATED ORAL
Qty: 60 TABLET | Refills: 3 | OUTPATIENT
Start: 2021-05-20

## 2021-05-20 NOTE — TELEPHONE ENCOUNTER
Requesting medication refill.  Please approve or deny this request.    Rx requested:  Requested Prescriptions     Pending Prescriptions Disp Refills    QUEtiapine (SEROQUEL) 300 MG tablet [Pharmacy Med Name: QUETIAPINE FUMARATE 300 MG TAB] 60 tablet 3     Sig: TAKE 1 TABLET BY MOUTH EVERY DAY IN THE EVENING       Last Office Visit:   4/6/2021    Last Filled:      Last Labs:      Next Visit Date:  Future Appointments   Date Time Provider Lizeth Long   10/6/2021 10:30 AM Ramon Thurston MD [facility]  [place]

## 2021-05-24 RX ORDER — QUETIAPINE FUMARATE 100 MG/1
100 TABLET, FILM COATED ORAL EVERY EVENING
Qty: 7 TABLET | Refills: 3 | Status: SHIPPED | OUTPATIENT
Start: 2021-05-24 | End: 2022-03-29

## 2021-06-01 ENCOUNTER — OFFICE VISIT (OUTPATIENT)
Dept: OBGYN CLINIC | Age: 26
End: 2021-06-01
Payer: COMMERCIAL

## 2021-06-01 VITALS
BODY MASS INDEX: 26.22 KG/M2 | SYSTOLIC BLOOD PRESSURE: 112 MMHG | WEIGHT: 148 LBS | HEART RATE: 74 BPM | DIASTOLIC BLOOD PRESSURE: 40 MMHG

## 2021-06-01 DIAGNOSIS — M94.0 COSTOCHONDRITIS, ACUTE: Primary | ICD-10-CM

## 2021-06-01 PROCEDURE — 99213 OFFICE O/P EST LOW 20 MIN: CPT | Performed by: OBSTETRICS & GYNECOLOGY

## 2021-06-01 PROCEDURE — 1036F TOBACCO NON-USER: CPT | Performed by: OBSTETRICS & GYNECOLOGY

## 2021-06-01 PROCEDURE — G8419 CALC BMI OUT NRM PARAM NOF/U: HCPCS | Performed by: OBSTETRICS & GYNECOLOGY

## 2021-06-01 PROCEDURE — G8427 DOCREV CUR MEDS BY ELIG CLIN: HCPCS | Performed by: OBSTETRICS & GYNECOLOGY

## 2021-06-01 ASSESSMENT — ENCOUNTER SYMPTOMS
VOICE CHANGE: 0
SORE THROAT: 0
WHEEZING: 0
SHORTNESS OF BREATH: 0
ABDOMINAL PAIN: 0
VOMITING: 0
BLOOD IN STOOL: 0
BACK PAIN: 0
COLOR CHANGE: 0
COUGH: 0
CHEST TIGHTNESS: 0
ABDOMINAL DISTENTION: 0
TROUBLE SWALLOWING: 0
NAUSEA: 0
CONSTIPATION: 0

## 2021-06-01 NOTE — PROGRESS NOTES
Jigna Key (: 1995) is a 27 y.o. female, Established patient, here for evaluation of the following chief complaint(s):  Breast Pain (past few days.)  Chiki Villavicencio is complaining of bilateral chest pain breast heaviness that has been present for about 2 weeks. Secretions no bleeding no headaches no vision changes. She is a  at [employer]:  HPI    History reviewed. No pertinent surgical history. Review of Systems   Constitutional: Negative for activity change, appetite change, fatigue and unexpected weight change. HENT: Negative for dental problem, ear pain, hearing loss, nosebleeds, sore throat, trouble swallowing and voice change. Eyes: Negative for visual disturbance. Respiratory: Negative for cough, chest tightness, shortness of breath and wheezing. Cardiovascular: Negative for chest pain and palpitations. Gastrointestinal: Negative for abdominal distention, abdominal pain, blood in stool, constipation, nausea and vomiting. Endocrine: Negative for cold intolerance, heat intolerance, polydipsia, polyphagia and polyuria. Genitourinary: Negative for difficulty urinating, dyspareunia, dysuria, flank pain, frequency, genital sores, hematuria, menstrual problem, pelvic pain, urgency, vaginal bleeding, vaginal discharge and vaginal pain. Musculoskeletal: Negative for arthralgias, back pain, joint swelling and myalgias. Skin: Negative for color change and rash. Allergic/Immunologic: Negative for environmental allergies, food allergies and immunocompromised state. Neurological: Negative for dizziness, seizures, syncope, speech difficulty, weakness, numbness and headaches. Hematological: Negative for adenopathy. Does not bruise/bleed easily. Psychiatric/Behavioral: Negative for agitation, behavioral problems, confusion, decreased concentration, dysphoric mood and suicidal ideas. The patient is not nervous/anxious and is not hyperactive. Physical Exam  Constitutional:       Appearance: She is well-developed. HENT:      Head: Normocephalic and atraumatic. Eyes:      Pupils: Pupils are equal, round, and reactive to light. Neck:      Thyroid: No thyromegaly. Trachea: No tracheal deviation. Cardiovascular:      Rate and Rhythm: Normal rate and regular rhythm. Heart sounds: Normal heart sounds. Pulmonary:      Effort: Pulmonary effort is normal.      Breath sounds: Normal breath sounds. Chest:      Chest wall: No tenderness. Abdominal:      General: Bowel sounds are normal. There is no distension. Palpations: Abdomen is soft. There is no mass. Tenderness: There is no abdominal tenderness. There is no guarding or rebound. Hernia: There is no hernia in the left inguinal area. Genitourinary:     Labia:         Right: No rash, tenderness, lesion or injury. Left: No rash, tenderness, lesion or injury. Vagina: Normal. No foreign body. No vaginal discharge, erythema, tenderness or bleeding. Cervix: No cervical motion tenderness or discharge. Uterus: Not deviated, not enlarged, not fixed and not tender. Adnexa:         Right: No mass, tenderness or fullness. Left: No mass, tenderness or fullness. Rectum: Normal. No mass, tenderness, anal fissure, external hemorrhoid or internal hemorrhoid. Normal anal tone. Musculoskeletal:         General: Normal range of motion. Cervical back: Normal range of motion. Lymphadenopathy:      Cervical: No cervical adenopathy. Neurological:      Mental Status: She is alert and oriented to person, place, and time. Vitals:    06/01/21 1459   BP: (!) 112/40   Pulse: 74   Weight: 148 lb (67.1 kg)       ASSESSMENT/PLAN:  Costochondritis  Recommend she see a chiropractor get her back adjusted. Aleve for the inflammation      No follow-ups on file.     On this date 6/1/2021 I have spent 20 minutes reviewing previous notes, test results and face to face with the patient discussing the diagnosis and importance of compliance with the treatment plan as well as documenting on the day of the visit. An electronic signature was used to authenticate this note.

## 2021-08-13 RX ORDER — DIVALPROEX SODIUM 250 MG/1
TABLET, DELAYED RELEASE ORAL
Qty: 270 TABLET | Refills: 1 | OUTPATIENT
Start: 2021-08-13

## 2021-08-16 RX ORDER — DIVALPROEX SODIUM 250 MG/1
TABLET, DELAYED RELEASE ORAL
Qty: 90 TABLET | Refills: 3 | Status: SHIPPED | OUTPATIENT
Start: 2021-08-16

## 2021-08-26 NOTE — BRIEF OP NOTE
Brief Postoperative Note      Patient: Maria R Stewart  YOB: 1995  MRN: [id number]    Section of Cardiology  Adult Brief Procedure Note        Procedure(s):  HERMAN with bubble study    Pre-operative Diagnosis:  TIA/CVA    H&P Status: Completed and reviewed. Post-operative Diagnosis:  LVEF 55% NO valve dz. No PFO Illicited despite 2 attempts with maneuvers to include coughing and rigorous pressure to RUQ. No Vegetations.  No Thrombus    Findings: see full report    Complications:  none    Primary Proceduralist:   Liz Cruz MD
None

## 2022-01-17 PROBLEM — Q21.12 PFO WITH ATRIAL SEPTAL ANEURYSM: Status: ACTIVE | Noted: 2021-04-14

## 2022-01-17 PROBLEM — I25.3 PFO WITH ATRIAL SEPTAL ANEURYSM: Status: ACTIVE | Noted: 2021-04-14

## 2022-01-17 PROBLEM — G45.9 TIA (TRANSIENT ISCHEMIC ATTACK): Status: ACTIVE | Noted: 2022-01-17

## 2022-03-29 ENCOUNTER — OFFICE VISIT (OUTPATIENT)
Dept: OBGYN CLINIC | Age: 27
End: 2022-03-29
Payer: COMMERCIAL

## 2022-03-29 VITALS
HEART RATE: 75 BPM | DIASTOLIC BLOOD PRESSURE: 62 MMHG | BODY MASS INDEX: 26.93 KG/M2 | WEIGHT: 152 LBS | SYSTOLIC BLOOD PRESSURE: 102 MMHG

## 2022-03-29 DIAGNOSIS — T80.89XA ABNORMAL BLEEDING AS MANIFESTATION OF BLOOD TRANSFUSION REACTION, INITIAL ENCOUNTER: Primary | ICD-10-CM

## 2022-03-29 DIAGNOSIS — R58 ABNORMAL BLEEDING AS MANIFESTATION OF BLOOD TRANSFUSION REACTION, INITIAL ENCOUNTER: Primary | ICD-10-CM

## 2022-03-29 DIAGNOSIS — O03.4 RETAINED PRODUCTS OF CONCEPTION FOLLOWING ABORTION: ICD-10-CM

## 2022-03-29 PROCEDURE — G8484 FLU IMMUNIZE NO ADMIN: HCPCS | Performed by: OBSTETRICS & GYNECOLOGY

## 2022-03-29 PROCEDURE — 1036F TOBACCO NON-USER: CPT | Performed by: OBSTETRICS & GYNECOLOGY

## 2022-03-29 PROCEDURE — G8419 CALC BMI OUT NRM PARAM NOF/U: HCPCS | Performed by: OBSTETRICS & GYNECOLOGY

## 2022-03-29 PROCEDURE — 99213 OFFICE O/P EST LOW 20 MIN: CPT | Performed by: OBSTETRICS & GYNECOLOGY

## 2022-03-29 PROCEDURE — G8427 DOCREV CUR MEDS BY ELIG CLIN: HCPCS | Performed by: OBSTETRICS & GYNECOLOGY

## 2022-03-29 RX ORDER — MEDROXYPROGESTERONE ACETATE 10 MG/1
10 TABLET ORAL DAILY
Qty: 10 TABLET | Refills: 1 | Status: SHIPPED | OUTPATIENT
Start: 2022-03-29

## 2022-03-29 ASSESSMENT — ENCOUNTER SYMPTOMS
WHEEZING: 0
CHEST TIGHTNESS: 0
SORE THROAT: 0
ABDOMINAL DISTENTION: 0
ABDOMINAL PAIN: 0
TROUBLE SWALLOWING: 0
VOICE CHANGE: 0
VOMITING: 0
COLOR CHANGE: 0
NAUSEA: 0
SHORTNESS OF BREATH: 0
BLOOD IN STOOL: 0
CONSTIPATION: 0
COUGH: 0
BACK PAIN: 0

## 2022-03-29 NOTE — PROGRESS NOTES
HPI:  Anita Marcus (: 1995) is a 24 y.o. female, Established patient, here for evaluation of the following chief complaint(s):  Follow-up (EAB 22 @ 9w1d by LMP. bleeding heavily since. Cytotec)  Patient has had persistent abnormal bleeding following a elective termination procedure in January of this year. SUBJECTIVE/OBJECTIVE:    No past surgical history on file. Review of Systems   Constitutional: Negative for activity change, appetite change, fatigue and unexpected weight change. HENT: Negative for dental problem, ear pain, hearing loss, nosebleeds, sore throat, trouble swallowing and voice change. Eyes: Negative for visual disturbance. Respiratory: Negative for cough, chest tightness, shortness of breath and wheezing. Cardiovascular: Negative for chest pain and palpitations. Gastrointestinal: Negative for abdominal distention, abdominal pain, blood in stool, constipation, nausea and vomiting. Endocrine: Negative for cold intolerance, heat intolerance, polydipsia, polyphagia and polyuria. Genitourinary: Positive for menstrual problem. Negative for difficulty urinating, dyspareunia, dysuria, flank pain, frequency, genital sores, hematuria, pelvic pain, urgency, vaginal bleeding, vaginal discharge and vaginal pain. Musculoskeletal: Negative for arthralgias, back pain, joint swelling and myalgias. Skin: Negative for color change and rash. Allergic/Immunologic: Negative for environmental allergies, food allergies and immunocompromised state. Neurological: Negative for dizziness, seizures, syncope, speech difficulty, weakness, numbness and headaches. Hematological: Negative for adenopathy. Does not bruise/bleed easily. Psychiatric/Behavioral: Negative for agitation, behavioral problems, confusion, decreased concentration, dysphoric mood and suicidal ideas. The patient is not nervous/anxious and is not hyperactive.         Physical Exam  Vitals and nursing note reviewed. Constitutional:       Appearance: Normal appearance. Neurological:      Mental Status: She is alert. Vitals:    03/29/22 1406   BP: 102/62   Pulse: 75   Weight: 152 lb (68.9 kg)         ASSESSMENT/PLAN:    Possible retained products of conception. Abnormal uterine bleeding    PLAN: Provera 10 mg for 10 days. Transvaginal ultrasound to rule out retained products of conception patient aware that she may need a D&C      No follow-ups on file. On this date 3/29/2022 I have spent 20 minutes reviewing previous notes, test results and face to face with the patient discussing the diagnosis and importance of compliance with the treatment plan as well as documenting on the day of the visit. An electronic signature was used to authenticate this note. Please note this report has been partially produced using speech recognition software and may cause contain errors related to that system including grammar, punctuation and spelling as well as words and phrases that may seem inappropriate. If there are questions or concerns please feel free to contact me to clarify.

## 2022-03-30 ENCOUNTER — TELEPHONE (OUTPATIENT)
Dept: OBGYN CLINIC | Age: 27
End: 2022-03-30

## 2022-03-30 NOTE — TELEPHONE ENCOUNTER
Pt called stating that she is taking provera for 10 days but today is the first day she has taken it and she is having really bad headaches. Spoke to Kierra Melgar LPN and she advise pt to take Excedrin with the provera to see if that helps with the headaches. Pt will call us back if the Excedrin does not help.

## 2022-04-05 ENCOUNTER — HOSPITAL ENCOUNTER (OUTPATIENT)
Dept: ULTRASOUND IMAGING | Age: 27
Discharge: HOME | End: 2022-04-07
Payer: COMMERCIAL

## 2022-04-05 DIAGNOSIS — R58 ABNORMAL BLEEDING AS MANIFESTATION OF BLOOD TRANSFUSION REACTION, INITIAL ENCOUNTER: ICD-10-CM

## 2022-04-05 DIAGNOSIS — T80.89XA ABNORMAL BLEEDING AS MANIFESTATION OF BLOOD TRANSFUSION REACTION, INITIAL ENCOUNTER: ICD-10-CM

## 2022-04-05 PROCEDURE — 76830 TRANSVAGINAL US NON-OB: CPT

## 2022-04-05 PROCEDURE — 76856 US EXAM PELVIC COMPLETE: CPT

## 2022-04-13 ENCOUNTER — TELEPHONE (OUTPATIENT)
Dept: OBGYN CLINIC | Age: 27
End: 2022-04-13

## 2022-04-13 DIAGNOSIS — R93.89 ENDOMETRIAL THICKENING ON ULTRASOUND: Primary | ICD-10-CM

## 2022-05-02 ENCOUNTER — TELEPHONE (OUTPATIENT)
Dept: OBGYN CLINIC | Age: 27
End: 2022-05-02

## 2022-05-02 NOTE — TELEPHONE ENCOUNTER
Pt was notified to schedule a follow up for US results. There was a 2nd order placed for another US does she need to complete that before her follow up?     Please advise

## 2022-05-06 ENCOUNTER — OFFICE VISIT (OUTPATIENT)
Dept: OBGYN CLINIC | Age: 27
End: 2022-05-06
Payer: COMMERCIAL

## 2022-05-06 VITALS
HEART RATE: 72 BPM | HEIGHT: 63 IN | BODY MASS INDEX: 27.11 KG/M2 | DIASTOLIC BLOOD PRESSURE: 62 MMHG | SYSTOLIC BLOOD PRESSURE: 104 MMHG | WEIGHT: 153 LBS

## 2022-05-06 DIAGNOSIS — O03.4 RETAINED PRODUCTS OF CONCEPTION FOLLOWING ABORTION: Primary | ICD-10-CM

## 2022-05-06 PROCEDURE — 1036F TOBACCO NON-USER: CPT | Performed by: OBSTETRICS & GYNECOLOGY

## 2022-05-06 PROCEDURE — G8419 CALC BMI OUT NRM PARAM NOF/U: HCPCS | Performed by: OBSTETRICS & GYNECOLOGY

## 2022-05-06 PROCEDURE — G8427 DOCREV CUR MEDS BY ELIG CLIN: HCPCS | Performed by: OBSTETRICS & GYNECOLOGY

## 2022-05-06 PROCEDURE — 99213 OFFICE O/P EST LOW 20 MIN: CPT | Performed by: OBSTETRICS & GYNECOLOGY

## 2022-05-06 ASSESSMENT — ENCOUNTER SYMPTOMS
VOICE CHANGE: 0
BLOOD IN STOOL: 0
CONSTIPATION: 0
WHEEZING: 0
ABDOMINAL PAIN: 0
NAUSEA: 0
VOMITING: 0
COUGH: 0
BACK PAIN: 0
CHEST TIGHTNESS: 0
ABDOMINAL DISTENTION: 0
SORE THROAT: 0
COLOR CHANGE: 0
SHORTNESS OF BREATH: 0
TROUBLE SWALLOWING: 0

## 2022-05-06 NOTE — PROGRESS NOTES
HPI:  Kristen Montelongo (: 1995) is a 24 y.o. female, Established patient, here for evaluation of the following chief complaint(s): Other (Discuss possible surgery. )  She had an abnormal ultrasound. She had a negative withdrawal bleed. She had a medical termination of pregnancy in January. It has not bled since that time. Again the ultrasound appears as if there is retained products of conception      SUBJECTIVE/OBJECTIVE:    No past surgical history on file. Review of Systems   Constitutional: Negative for activity change, appetite change, fatigue and unexpected weight change. HENT: Negative for dental problem, ear pain, hearing loss, nosebleeds, sore throat, trouble swallowing and voice change. Eyes: Negative for visual disturbance. Respiratory: Negative for cough, chest tightness, shortness of breath and wheezing. Cardiovascular: Negative for chest pain and palpitations. Gastrointestinal: Negative for abdominal distention, abdominal pain, blood in stool, constipation, nausea and vomiting. Endocrine: Negative for cold intolerance, heat intolerance, polydipsia, polyphagia and polyuria. Genitourinary: Negative for difficulty urinating, dyspareunia, dysuria, flank pain, frequency, genital sores, hematuria, menstrual problem, pelvic pain, urgency, vaginal bleeding, vaginal discharge and vaginal pain. Musculoskeletal: Negative for arthralgias, back pain, joint swelling and myalgias. Skin: Negative for color change and rash. Allergic/Immunologic: Negative for environmental allergies, food allergies and immunocompromised state. Neurological: Negative for dizziness, seizures, syncope, speech difficulty, weakness, numbness and headaches. Hematological: Negative for adenopathy. Does not bruise/bleed easily. Psychiatric/Behavioral: Negative for agitation, behavioral problems, confusion, decreased concentration, dysphoric mood and suicidal ideas.  The patient is not nervous/anxious and is not hyperactive. Physical Exam  Vitals and nursing note reviewed. Constitutional:       Appearance: Normal appearance. Neurological:      Mental Status: She is alert. Vitals:    05/06/22 1122   BP: 104/62   Pulse: 72   Weight: 153 lb (69.4 kg)   Height: 5' 3\" (1.6 m)         ASSESSMENT/PLAN:    Retained products of conception following a termination of pregnancy    PLAN: Hysteroscopy and a suction and sharp D&C. Risk benefits alternatives of the procedure were explained to her in detail. No follow-ups on file. On this date 5/6/2022 I have spent 20 minutes reviewing previous notes, test results and face to face with the patient discussing the diagnosis and importance of compliance with the treatment plan as well as documenting on the day of the visit. An electronic signature was used to authenticate this note. Please note this report has been partially produced using speech recognition software and may cause contain errors related to that system including grammar, punctuation and spelling as well as words and phrases that may seem inappropriate. If there are questions or concerns please feel free to contact me to clarify.

## 2022-05-09 ENCOUNTER — ANESTHESIA (OUTPATIENT)
Dept: OPERATING ROOM | Age: 27
End: 2022-05-09
Payer: COMMERCIAL

## 2022-05-09 ENCOUNTER — HOSPITAL ENCOUNTER (OUTPATIENT)
Age: 27
Setting detail: OUTPATIENT SURGERY
Discharge: HOME | End: 2022-05-09
Attending: OBSTETRICS & GYNECOLOGY | Admitting: OBSTETRICS & GYNECOLOGY
Payer: COMMERCIAL

## 2022-05-09 ENCOUNTER — ANESTHESIA EVENT (OUTPATIENT)
Dept: OPERATING ROOM | Age: 27
End: 2022-05-09
Payer: COMMERCIAL

## 2022-05-09 VITALS
HEIGHT: 63 IN | BODY MASS INDEX: 27.11 KG/M2 | HEART RATE: 70 BPM | OXYGEN SATURATION: 100 % | TEMPERATURE: 97.7 F | WEIGHT: 153 LBS | RESPIRATION RATE: 18 BRPM | DIASTOLIC BLOOD PRESSURE: 70 MMHG | SYSTOLIC BLOOD PRESSURE: 112 MMHG

## 2022-05-09 VITALS — OXYGEN SATURATION: 99 % | SYSTOLIC BLOOD PRESSURE: 100 MMHG | DIASTOLIC BLOOD PRESSURE: 57 MMHG

## 2022-05-09 DIAGNOSIS — G89.18 POSTOPERATIVE PAIN: Primary | ICD-10-CM

## 2022-05-09 LAB
ABO/RH: NORMAL
ANTIBODY SCREEN: NORMAL
BASOPHILS ABSOLUTE: 0 K/UL (ref 0–0.2)
BASOPHILS RELATIVE PERCENT: 0.7 %
EOSINOPHILS ABSOLUTE: 0 K/UL (ref 0–0.7)
EOSINOPHILS RELATIVE PERCENT: 1.1 %
HCG, URINE, POC: NEGATIVE
HCT VFR BLD CALC: 34.5 % (ref 37–47)
HEMOGLOBIN: 11.5 G/DL (ref 12–16)
LYMPHOCYTES ABSOLUTE: 1.8 K/UL (ref 1–4.8)
LYMPHOCYTES RELATIVE PERCENT: 44.1 %
Lab: NORMAL
MCH RBC QN AUTO: 28.7 PG (ref 27–31.3)
MCHC RBC AUTO-ENTMCNC: 33.3 % (ref 33–37)
MCV RBC AUTO: 86.2 FL (ref 82–100)
MONOCYTES ABSOLUTE: 0.4 K/UL (ref 0.2–0.8)
MONOCYTES RELATIVE PERCENT: 9.8 %
NEGATIVE QC PASS/FAIL: NORMAL
NEUTROPHILS ABSOLUTE: 1.8 K/UL (ref 1.4–6.5)
NEUTROPHILS RELATIVE PERCENT: 44.3 %
PDW BLD-RTO: 12.7 % (ref 11.5–14.5)
PLATELET # BLD: 212 K/UL (ref 130–400)
POSITIVE QC PASS/FAIL: NORMAL
RBC # BLD: 4.01 M/UL (ref 4.2–5.4)
WBC # BLD: 4.1 K/UL (ref 4.8–10.8)

## 2022-05-09 PROCEDURE — 6360000002 HC RX W HCPCS: Performed by: NURSE ANESTHETIST, CERTIFIED REGISTERED

## 2022-05-09 PROCEDURE — 88342 IMHCHEM/IMCYTCHM 1ST ANTB: CPT

## 2022-05-09 PROCEDURE — 2580000003 HC RX 258: Performed by: NURSE ANESTHETIST, CERTIFIED REGISTERED

## 2022-05-09 PROCEDURE — 7100000000 HC PACU RECOVERY - FIRST 15 MIN: Performed by: OBSTETRICS & GYNECOLOGY

## 2022-05-09 PROCEDURE — 2580000003 HC RX 258: Performed by: OBSTETRICS & GYNECOLOGY

## 2022-05-09 PROCEDURE — 7100000010 HC PHASE II RECOVERY - FIRST 15 MIN: Performed by: OBSTETRICS & GYNECOLOGY

## 2022-05-09 PROCEDURE — 2500000003 HC RX 250 WO HCPCS: Performed by: NURSE ANESTHETIST, CERTIFIED REGISTERED

## 2022-05-09 PROCEDURE — 3600000014 HC SURGERY LEVEL 4 ADDTL 15MIN: Performed by: OBSTETRICS & GYNECOLOGY

## 2022-05-09 PROCEDURE — 2709999900 HC NON-CHARGEABLE SUPPLY: Performed by: OBSTETRICS & GYNECOLOGY

## 2022-05-09 PROCEDURE — 86850 RBC ANTIBODY SCREEN: CPT

## 2022-05-09 PROCEDURE — 86900 BLOOD TYPING SEROLOGIC ABO: CPT

## 2022-05-09 PROCEDURE — 59812 TREATMENT OF MISCARRIAGE: CPT | Performed by: OBSTETRICS & GYNECOLOGY

## 2022-05-09 PROCEDURE — 85025 COMPLETE CBC W/AUTO DIFF WBC: CPT

## 2022-05-09 PROCEDURE — 86901 BLOOD TYPING SEROLOGIC RH(D): CPT

## 2022-05-09 PROCEDURE — 7100000011 HC PHASE II RECOVERY - ADDTL 15 MIN: Performed by: OBSTETRICS & GYNECOLOGY

## 2022-05-09 PROCEDURE — 3700000001 HC ADD 15 MINUTES (ANESTHESIA): Performed by: OBSTETRICS & GYNECOLOGY

## 2022-05-09 PROCEDURE — 7100000001 HC PACU RECOVERY - ADDTL 15 MIN: Performed by: OBSTETRICS & GYNECOLOGY

## 2022-05-09 PROCEDURE — 88305 TISSUE EXAM BY PATHOLOGIST: CPT

## 2022-05-09 PROCEDURE — 3600000004 HC SURGERY LEVEL 4 BASE: Performed by: OBSTETRICS & GYNECOLOGY

## 2022-05-09 PROCEDURE — 6360000002 HC RX W HCPCS: Performed by: OBSTETRICS & GYNECOLOGY

## 2022-05-09 PROCEDURE — 3700000000 HC ANESTHESIA ATTENDED CARE: Performed by: OBSTETRICS & GYNECOLOGY

## 2022-05-09 RX ORDER — LABETALOL HYDROCHLORIDE 5 MG/ML
10 INJECTION, SOLUTION INTRAVENOUS
Status: DISCONTINUED | OUTPATIENT
Start: 2022-05-09 | End: 2022-05-09 | Stop reason: HOSPADM

## 2022-05-09 RX ORDER — SODIUM CHLORIDE 9 MG/ML
INJECTION, SOLUTION INTRAVENOUS PRN
Status: DISCONTINUED | OUTPATIENT
Start: 2022-05-09 | End: 2022-05-09 | Stop reason: HOSPADM

## 2022-05-09 RX ORDER — SODIUM CHLORIDE 0.9 % (FLUSH) 0.9 %
5-40 SYRINGE (ML) INJECTION PRN
Status: DISCONTINUED | OUTPATIENT
Start: 2022-05-09 | End: 2022-05-09 | Stop reason: HOSPADM

## 2022-05-09 RX ORDER — DEXAMETHASONE SODIUM PHOSPHATE 10 MG/ML
INJECTION INTRAMUSCULAR; INTRAVENOUS PRN
Status: DISCONTINUED | OUTPATIENT
Start: 2022-05-09 | End: 2022-05-09 | Stop reason: SDUPTHER

## 2022-05-09 RX ORDER — FENTANYL CITRATE 50 UG/ML
25 INJECTION, SOLUTION INTRAMUSCULAR; INTRAVENOUS EVERY 5 MIN PRN
Status: DISCONTINUED | OUTPATIENT
Start: 2022-05-09 | End: 2022-05-09 | Stop reason: HOSPADM

## 2022-05-09 RX ORDER — PROPOFOL 10 MG/ML
INJECTION, EMULSION INTRAVENOUS PRN
Status: DISCONTINUED | OUTPATIENT
Start: 2022-05-09 | End: 2022-05-09 | Stop reason: SDUPTHER

## 2022-05-09 RX ORDER — ONDANSETRON 4 MG/1
4 TABLET, ORALLY DISINTEGRATING ORAL EVERY 8 HOURS PRN
Status: DISCONTINUED | OUTPATIENT
Start: 2022-05-09 | End: 2022-05-09 | Stop reason: HOSPADM

## 2022-05-09 RX ORDER — ONDANSETRON 2 MG/ML
4 INJECTION INTRAMUSCULAR; INTRAVENOUS EVERY 6 HOURS PRN
Status: DISCONTINUED | OUTPATIENT
Start: 2022-05-09 | End: 2022-05-09 | Stop reason: HOSPADM

## 2022-05-09 RX ORDER — HYDRALAZINE HYDROCHLORIDE 20 MG/ML
10 INJECTION INTRAMUSCULAR; INTRAVENOUS
Status: DISCONTINUED | OUTPATIENT
Start: 2022-05-09 | End: 2022-05-09 | Stop reason: HOSPADM

## 2022-05-09 RX ORDER — MEPERIDINE HYDROCHLORIDE 25 MG/ML
12.5 INJECTION INTRAMUSCULAR; INTRAVENOUS; SUBCUTANEOUS EVERY 5 MIN PRN
Status: DISCONTINUED | OUTPATIENT
Start: 2022-05-09 | End: 2022-05-09 | Stop reason: HOSPADM

## 2022-05-09 RX ORDER — OXYCODONE HYDROCHLORIDE 5 MG/1
5 TABLET ORAL
Status: DISCONTINUED | OUTPATIENT
Start: 2022-05-09 | End: 2022-05-09 | Stop reason: HOSPADM

## 2022-05-09 RX ORDER — ONDANSETRON 2 MG/ML
4 INJECTION INTRAMUSCULAR; INTRAVENOUS
Status: DISCONTINUED | OUTPATIENT
Start: 2022-05-09 | End: 2022-05-09 | Stop reason: HOSPADM

## 2022-05-09 RX ORDER — IBUPROFEN 600 MG/1
600 TABLET ORAL 4 TIMES DAILY PRN
Qty: 40 TABLET | Refills: 0 | Status: SHIPPED | OUTPATIENT
Start: 2022-05-09

## 2022-05-09 RX ORDER — HYDROCODONE BITARTRATE AND ACETAMINOPHEN 5; 325 MG/1; MG/1
1 TABLET ORAL EVERY 6 HOURS PRN
Qty: 12 TABLET | Refills: 0 | Status: SHIPPED | OUTPATIENT
Start: 2022-05-09 | End: 2022-05-12

## 2022-05-09 RX ORDER — ONDANSETRON 2 MG/ML
INJECTION INTRAMUSCULAR; INTRAVENOUS PRN
Status: DISCONTINUED | OUTPATIENT
Start: 2022-05-09 | End: 2022-05-09 | Stop reason: SDUPTHER

## 2022-05-09 RX ORDER — SODIUM CHLORIDE 0.9 % (FLUSH) 0.9 %
5-40 SYRINGE (ML) INJECTION EVERY 12 HOURS SCHEDULED
Status: DISCONTINUED | OUTPATIENT
Start: 2022-05-09 | End: 2022-05-09 | Stop reason: HOSPADM

## 2022-05-09 RX ORDER — FENTANYL CITRATE 50 UG/ML
INJECTION, SOLUTION INTRAMUSCULAR; INTRAVENOUS PRN
Status: DISCONTINUED | OUTPATIENT
Start: 2022-05-09 | End: 2022-05-09 | Stop reason: SDUPTHER

## 2022-05-09 RX ORDER — MAGNESIUM HYDROXIDE 1200 MG/15ML
LIQUID ORAL CONTINUOUS PRN
Status: DISCONTINUED | OUTPATIENT
Start: 2022-05-09 | End: 2022-05-09 | Stop reason: HOSPADM

## 2022-05-09 RX ORDER — METOCLOPRAMIDE HYDROCHLORIDE 5 MG/ML
10 INJECTION INTRAMUSCULAR; INTRAVENOUS
Status: DISCONTINUED | OUTPATIENT
Start: 2022-05-09 | End: 2022-05-09 | Stop reason: HOSPADM

## 2022-05-09 RX ORDER — SODIUM CHLORIDE, SODIUM LACTATE, POTASSIUM CHLORIDE, CALCIUM CHLORIDE 600; 310; 30; 20 MG/100ML; MG/100ML; MG/100ML; MG/100ML
INJECTION, SOLUTION INTRAVENOUS CONTINUOUS
Status: DISCONTINUED | OUTPATIENT
Start: 2022-05-09 | End: 2022-05-09 | Stop reason: HOSPADM

## 2022-05-09 RX ORDER — MIDAZOLAM HYDROCHLORIDE 1 MG/ML
INJECTION INTRAMUSCULAR; INTRAVENOUS PRN
Status: DISCONTINUED | OUTPATIENT
Start: 2022-05-09 | End: 2022-05-09 | Stop reason: SDUPTHER

## 2022-05-09 RX ORDER — SODIUM CHLORIDE 9 MG/ML
25 INJECTION, SOLUTION INTRAVENOUS PRN
Status: DISCONTINUED | OUTPATIENT
Start: 2022-05-09 | End: 2022-05-09 | Stop reason: HOSPADM

## 2022-05-09 RX ORDER — SODIUM CHLORIDE, SODIUM LACTATE, POTASSIUM CHLORIDE, CALCIUM CHLORIDE 600; 310; 30; 20 MG/100ML; MG/100ML; MG/100ML; MG/100ML
INJECTION, SOLUTION INTRAVENOUS CONTINUOUS PRN
Status: DISCONTINUED | OUTPATIENT
Start: 2022-05-09 | End: 2022-05-09 | Stop reason: SDUPTHER

## 2022-05-09 RX ORDER — LIDOCAINE HYDROCHLORIDE 20 MG/ML
INJECTION, SOLUTION INFILTRATION; PERINEURAL PRN
Status: DISCONTINUED | OUTPATIENT
Start: 2022-05-09 | End: 2022-05-09 | Stop reason: SDUPTHER

## 2022-05-09 RX ADMIN — CEFAZOLIN SODIUM 2000 MG: 10 INJECTION, POWDER, FOR SOLUTION INTRAVENOUS at 12:46

## 2022-05-09 RX ADMIN — LIDOCAINE HYDROCHLORIDE 6 MG: 20 INJECTION, SOLUTION INFILTRATION; PERINEURAL at 12:35

## 2022-05-09 RX ADMIN — SODIUM CHLORIDE, POTASSIUM CHLORIDE, SODIUM LACTATE AND CALCIUM CHLORIDE: 600; 310; 30; 20 INJECTION, SOLUTION INTRAVENOUS at 12:31

## 2022-05-09 RX ADMIN — DEXAMETHASONE SODIUM PHOSPHATE 10 MG: 10 INJECTION INTRAMUSCULAR; INTRAVENOUS at 12:49

## 2022-05-09 RX ADMIN — SODIUM CHLORIDE, POTASSIUM CHLORIDE, SODIUM LACTATE AND CALCIUM CHLORIDE: 600; 310; 30; 20 INJECTION, SOLUTION INTRAVENOUS at 11:00

## 2022-05-09 RX ADMIN — ONDANSETRON 4 MG: 2 INJECTION INTRAMUSCULAR; INTRAVENOUS at 12:49

## 2022-05-09 RX ADMIN — MIDAZOLAM HYDROCHLORIDE 2 MG: 1 INJECTION, SOLUTION INTRAMUSCULAR; INTRAVENOUS at 12:25

## 2022-05-09 RX ADMIN — PROPOFOL 200 MG: 10 INJECTION, EMULSION INTRAVENOUS at 12:35

## 2022-05-09 RX ADMIN — FENTANYL CITRATE 100 MCG: 50 INJECTION, SOLUTION INTRAMUSCULAR; INTRAVENOUS at 12:35

## 2022-05-09 ASSESSMENT — PULMONARY FUNCTION TESTS
PIF_VALUE: 2
PIF_VALUE: 2
PIF_VALUE: 14
PIF_VALUE: 15
PIF_VALUE: 14
PIF_VALUE: 0
PIF_VALUE: 0
PIF_VALUE: 14
PIF_VALUE: 2
PIF_VALUE: 4
PIF_VALUE: 1
PIF_VALUE: 14
PIF_VALUE: 3
PIF_VALUE: 14
PIF_VALUE: 1
PIF_VALUE: 1
PIF_VALUE: 20
PIF_VALUE: 14
PIF_VALUE: 2
PIF_VALUE: 0
PIF_VALUE: 2
PIF_VALUE: 1
PIF_VALUE: 20
PIF_VALUE: 1
PIF_VALUE: 14
PIF_VALUE: 14
PIF_VALUE: 2
PIF_VALUE: 0
PIF_VALUE: 2
PIF_VALUE: 14
PIF_VALUE: 2

## 2022-05-09 ASSESSMENT — PAIN SCALES - GENERAL
PAINLEVEL_OUTOF10: 0
PAINLEVEL_OUTOF10: 0

## 2022-05-09 NOTE — H&P
Department of Obstetrics and Gynecology   History & Physical    Pt Name: Roque Page  MRN: [id number] Angel #: [[id number]]  YOB: 1995  Estimated Date of Delivery: None noted. HPI: The patient is a 24 y.o.  female  who dents for a D&C due to retained products of conception    Allergies: Allergies as of 2022    (No Known Allergies)       Medications:    Current Facility-Administered Medications:     lactated ringers infusion, , IntraVENous, Continuous, Sonu Razo DO, Last Rate: 125 mL/hr at 22 1100, New Bag at 22 1100    ceFAZolin (ANCEF) 2000 mg in dextrose 5 % 100 mL IVPB, 2,000 mg, IntraVENous, On Call to OR, Sonu Razo DO    OB History: [id number]    Gyn History: Denies h/o abnormal pap smear, h/o STDs. Past Medical History:   Past Medical History:   Diagnosis Date    Anemia     Anxiety     Bipolar 1 disorder ([facility]     Depression        Past Surgical History: No past surgical history on file. Social History:   Social History     Tobacco Use   Smoking Status Former Smoker    Packs/day: 0.25    Years: 2.00    Pack years: 0.50    Quit date: 2018    Years since quitting: 3.5   Smokeless Tobacco Never Used        Family History: Noncontributory; Denies h/o cancer. ROS:  Negative except as stated in HPI, denies nausea, vomiting, fever, chills, headache or dysuria.      PE:  Vitals:    22 1000   BP: 110/71   Pulse: 76   Resp: 16   Temp: 97.4 °F (36.3 °C)   SpO2: 100%       General: well nourished, well developed, in no acute distress  CV: Normal heart sounds  Resp: breathing unlabored  Abdomen: Nontender, no rebound, no guarding  FH:  Cx:    NST - Cat 1: FHR 140s, moderate variability, +accels, -decels    Labs:       Assessment:   24 y.o.  female with retained products of conception    Plan: Suction D&C      Sonu Razo DO

## 2022-05-09 NOTE — FLOWSHEET NOTE
1422- Discharge instructions gone over with patient, explained to patient importance of drinking fluids and taking it easy today, pt verbalizes understanding, meds delivered to patient by outpatient pharmacy-[facility]  Electronically signed by Kristen Block RN on 5/9/2022

## 2022-05-09 NOTE — PROGRESS NOTES
CLINICAL PHARMACY NOTE: MEDS TO BEDS    Total # of Prescriptions Filled: 2   The following medications were delivered to the patient:  · Ibuprofen 600mg tab  · Hydrocodone/APAP 5-325mg tab     Additional Documentation:

## 2022-05-09 NOTE — ANESTHESIA PRE PROCEDURE
Department of Anesthesiology  Preprocedure Note       Name:  Paulina Torres   Age:  24 y.o.  :  1995                                          MRN:  [id number]         Date:  2022      Surgeon: Mega Sin):  Delmy Salmeron DO    Procedure: Procedure(s): HYSTEROSCOPY SUCTION SHARP DILATATION AND CURETTAGE  (PAT ON ADMIT)    Medications prior to admission:   Prior to Admission medications    Medication Sig Start Date End Date Taking? Authorizing Provider   medroxyPROGESTERone (PROVERA) 10 MG tablet Take 1 tablet by mouth daily  Patient not taking: Reported on 2022 3/29/22   Marixa File DO Rosenda   divalproex (DEPAKOTE) 250 MG DR tablet Take 2 tablets in the morning and 1 tablet in the evening. 21   Lisa Castaneda MD   divalproex (DEPAKOTE) 500 MG DR tablet Take 1.5 tablets daily .  21   Lisa Castaneda MD       Current medications:    Current Facility-Administered Medications   Medication Dose Route Frequency Provider Last Rate Last Admin    lactated ringers infusion   IntraVENous Continuous Delmy Salmeron  mL/hr at 22 1100 New Bag at 22 1100    ceFAZolin (ANCEF) 2000 mg in dextrose 5 % 100 mL IVPB  2,000 mg IntraVENous On Call to DO Latanya           Allergies:  No Known Allergies    Problem List:    Patient Active Problem List   Diagnosis Code    Decreased fetal movements in third trimester O36.8130    Term pregnancy Z34.90    Threatened labor O47.9    Acute cystitis without hematuria N30.00    38 weeks gestation of pregnancy Z3A.38    Admitted to labor and delivery Z78.9    39 weeks gestation of pregnancy Z3A.39    Stroke-like episode R29.90    Migraine variant G43.809    General weakness R53.1    PFO with atrial septal aneurysm Q21.1    TIA (transient ischemic attack) G45.9       Past Medical History:        Diagnosis Date    Anemia     Anxiety     Bipolar 1 disorder ([facility] [address]     Depression        Past Surgical History:  No past surgical history on file. Social History:    Social History     Tobacco Use    Smoking status: Former Smoker     Packs/day: 0.25     Years: 2.00     Pack years: 0.50     Quit date: 11/1/2018     Years since quitting: 3.5    Smokeless tobacco: Never Used   Substance Use Topics    Alcohol use: Yes     Comment: occas                                Counseling given: Not Answered      Vital Signs (Current):   Vitals:    05/09/22 1000   BP: 110/71   Pulse: 76   Resp: 16   Temp: 97.4 °F (36.3 °C)   TempSrc: Temporal   SpO2: 100%   Weight: 153 lb (69.4 kg)   Height: 5' 3\" (1.6 m)                                              BP Readings from Last 3 Encounters:   05/09/22 110/71   05/06/22 104/62   03/29/22 102/62       NPO Status: Time of last liquid consumption: 2200                        Time of last solid consumption: 2200                        Date of last liquid consumption: 05/08/22                        Date of last solid food consumption: 05/08/22    BMI:   Wt Readings from Last 3 Encounters:   05/09/22 153 lb (69.4 kg)   05/06/22 153 lb (69.4 kg)   03/29/22 152 lb (68.9 kg)     Body mass index is 27.1 kg/m². CBC:   Lab Results   Component Value Date    WBC 4.1 05/09/2022    RBC 4.01 05/09/2022    HGB 11.5 05/09/2022    HCT 34.5 05/09/2022    MCV 86.2 05/09/2022    RDW 12.7 05/09/2022     05/09/2022       CMP:   Lab Results   Component Value Date     11/04/2020    K 4.2 11/04/2020     11/04/2020    CO2 24 11/04/2020    BUN 17 11/04/2020    CREATININE 0.69 11/04/2020    GFRAA >60.0 11/04/2020    LABGLOM >60.0 11/04/2020    GLUCOSE 87 11/04/2020    PROT 7.1 11/03/2020    CALCIUM 8.3 11/04/2020    BILITOT 0.4 11/03/2020    ALKPHOS 49 11/03/2020    AST 14 11/03/2020    ALT 9 11/03/2020       POC Tests: No results for input(s): POCGLU, POCNA, POCK, POCCL, POCBUN, POCHEMO, POCHCT in the last 72 hours.     Coags:   Lab Results   Component Value Date    PROTIME 13.4 11/03/2020    INR 1.0 11/03/2020    APTT 32.6 11/03/2020       HCG (If Applicable): No results found for: PREGTESTUR, PREGSERUM, HCG, HCGQUANT     ABGs: No results found for: PHART, PO2ART, HAP2BRD, YPN3OAK, BEART, Y5QXWFEJ     Type & Screen (If Applicable):  No results found for: LABABO, LABRH    Drug/Infectious Status (If Applicable):  No results found for: HIV, HEPCAB    COVID-19 Screening (If Applicable): No results found for: COVID19        Anesthesia Evaluation  Patient summary reviewed and Nursing notes reviewed no history of anesthetic complications:   Airway: Mallampati: II  TM distance: >3 FB   Neck ROM: full  Mouth opening: > = 3 FB Dental: normal exam         Pulmonary:Negative Pulmonary ROS and normal exam                               Cardiovascular:Negative CV ROS  Exercise tolerance: good (>4 METS),         ECG reviewed               Beta Blocker:  Not on Beta Blocker         Neuro/Psych:   Negative Neuro/Psych ROS  (+) TIA,             GI/Hepatic/Renal: Neg GI/Hepatic/Renal ROS            Endo/Other: Negative Endo/Other ROS             Pt had PAT visit. Abdominal:             Vascular: negative vascular ROS. Other Findings:             Anesthesia Plan      general     ASA 2       Induction: intravenous. MIPS: Postoperative opioids intended and Prophylactic antiemetics administered. Anesthetic plan and risks discussed with patient. Plan discussed with CRNA.     Attending anesthesiologist reviewed and agrees with Pre Eval content              Asaf Momin MD   5/9/2022

## 2022-05-09 NOTE — BRIEF OP NOTE
Brief Postoperative Note  ______________________________________________________________    Patient: John Smith  YOB: 1995  MRN: [id number]  Date of Procedure: 2022    Pre-Op Diagnosis: thickened endometrium following     Post-Op Diagnosis: Same       Procedure(s):   HYSTEROSCOPY SUCTION SHARP DILATATION AND CURETTAGE    Anesthesia: General    Surgeon(s):  Eugenia Palacios DO    Staff:  Surgeons and assistants    Estimated Blood Loss: minima    Complications: None    Specimens:   ID Type Source Tests Collected by Time Destination   A : RETAINED TISSUE Tissue Uterus [facility], DO 2022 1255        Implants:  * No implants in log *      Drains: * No LDAs found *    Findings: area of abnormal appearing endometrium on anterior wall    Eugenia Palacios DO  Date: 2022  Time: 1:03 PM

## 2022-05-09 NOTE — ANESTHESIA POSTPROCEDURE EVALUATION
Department of Anesthesiology  Postprocedure Note    Patient: Kristen Montelongo  MRN: [id number]  YOB: 1995  Date of evaluation: 5/9/2022  Time:  1:21 PM     Procedure Summary     Date: 05/09/22 Room / Location: [facility] OR [address] [place] [address]    Anesthesia Start: 3999 Anesthesia Stop: 9372    Procedure: HYSTEROSCOPY SUCTION SHARP DILATATION AND CURETTAGE (N/A Uterus) Diagnosis: (RETAINED PRODUCTS OF CONCEPTION)    Surgeons: Joseph Caldera DO Responsible Provider: Rosey Hoff MD    Anesthesia Type: general ASA Status: 2          Anesthesia Type: No value filed. Kenny Phase I: Kenny Score: 9    Kenny Phase II:      Last vitals: Reviewed and per EMR flowsheets.        Anesthesia Post Evaluation    Patient location during evaluation: PACU  Patient participation: complete - patient participated  Level of consciousness: responsive to light touch  Pain score: 0  Airway patency: patent  Nausea & Vomiting: no nausea and no vomiting  Complications: no  Cardiovascular status: blood pressure returned to baseline and hemodynamically stable  Respiratory status: acceptable, spontaneous ventilation, nonlabored ventilation and face mask  Hydration status: euvolemic

## 2022-05-09 NOTE — OP NOTE
Operative Report    PATIENT NAME: Oswaldo Ortiz RECORD NO. [id number]  SURGEON: Minh Villanueva  Primary Care Physician: Yousif Keating MD     PROCEDURE PERFORMED:  5/9/2022  PREOPERATIVE DIAGNOSIS:thickened endometrium following EAB  POSTOPERATIVE DIAGNOSIS: Same with abnormal endometrium on anterior wall of uterus  PROCEDURE PERFORMED:  Hysteroscopy, D and C  SURGEON:  Dr. Kristofer Ibarra:  GET  ESTIMATED BLOOD LOSS:  minimal  SPECIMEN: endometrium  COMPLICATIONS:  None immediately appreciated. DISCUSSION: Liz Cummins is a 24y.o. year old female who presents for a d and C  After history and physical examination was performed, potential diagnostic and therapeutic modalities discussed with the patient. She was given opportunity to ask questions. Once answered informed consent was obtained. She was brought to operating room on 5/9/2022 for procedure. PROCEDURE:   The patient was taken to the operating room  where a general anesthetic was administered. She was then placed in the dorsal lithotomy position, prepped and draped in usual fashion. The  bladder was drained. Exam under anesthesia reveals a small anteverted mobile uterus. The adnexa was negative for mass. The cervix was exposed. The anterior lip was grasped with a tenaculum. The endocervical canal was progressively dilated. Uterus sounds to 8 cm. Hysteroscope was inserted. The intrauterine cavity appears abnormal anteriorly. Tubal ostia appeared normal.  No evidence of any polyps. A D and C was performed after the  hysteroscope was removed. FINDINGS:  a patch of hypervascular and polypoid appearing endometrium on anterior wall of IUC          Electronically signed by Kelvin Turner DO on 5/9/22.     Please note this report has been partially produced using speech recognition software and may cause contain errors related to that system including grammar, punctuation and spelling as well as words and phrases that may seem inappropriate. If there are questions or concerns, please feel free to contact me to clarify.

## 2022-05-18 ENCOUNTER — TELEPHONE (OUTPATIENT)
Dept: OBGYN CLINIC | Age: 27
End: 2022-05-18

## 2022-05-18 NOTE — TELEPHONE ENCOUNTER
Pt had a D&C a week ago and complains of a yellowish discharge and odor. Nurse was contacted and pt made aware that this is normal to have after the procedure. If it lasts more than a couple weeks she can give the office a call back.

## 2024-05-30 ENCOUNTER — OFFICE VISIT (OUTPATIENT)
Dept: OBGYN CLINIC | Age: 29
End: 2024-05-30
Payer: COMMERCIAL

## 2024-05-30 VITALS
HEIGHT: 63 IN | SYSTOLIC BLOOD PRESSURE: 110 MMHG | WEIGHT: 169 LBS | BODY MASS INDEX: 29.95 KG/M2 | DIASTOLIC BLOOD PRESSURE: 70 MMHG | HEART RATE: 82 BPM

## 2024-05-30 DIAGNOSIS — N91.2 AMENORRHEA: ICD-10-CM

## 2024-05-30 DIAGNOSIS — N91.2 AMENORRHEA: Primary | ICD-10-CM

## 2024-05-30 LAB
AMPHET UR QL SCN: NORMAL
BACTERIA URNS QL MICRO: ABNORMAL /HPF
BARBITURATES UR QL SCN: NORMAL
BENZODIAZ UR QL SCN: NORMAL
BILIRUB UR QL STRIP: NEGATIVE
CANNABINOIDS UR QL SCN: NORMAL
CLARITY UR: ABNORMAL
COCAINE UR QL SCN: NORMAL
COLOR UR: YELLOW
DRUG SCREEN COMMENT UR-IMP: NORMAL
EPI CELLS #/AREA URNS AUTO: ABNORMAL /HPF (ref 0–5)
FENTANYL SCREEN, URINE: NORMAL
GLUCOSE UR STRIP-MCNC: NEGATIVE MG/DL
HGB UR QL STRIP: NEGATIVE
HYALINE CASTS #/AREA URNS AUTO: ABNORMAL /HPF (ref 0–5)
HYALINE CASTS #/AREA URNS LPF: ABNORMAL /LPF (ref 0–5)
KETONES UR STRIP-MCNC: NEGATIVE MG/DL
LEUKOCYTE ESTERASE UR QL STRIP: ABNORMAL
METHADONE UR QL SCN: NORMAL
NITRITE UR QL STRIP: NEGATIVE
OPIATES UR QL SCN: NORMAL
OXYCODONE UR QL SCN: NORMAL
PCP UR QL SCN: NORMAL
PH UR STRIP: 7 [PH] (ref 5–9)
PROPOXYPH UR QL SCN: NORMAL
PROT UR STRIP-MCNC: ABNORMAL MG/DL
SP GR UR STRIP: 1.02 (ref 1–1.03)
UROBILINOGEN UR STRIP-ACNC: 0.2 E.U./DL

## 2024-05-30 PROCEDURE — 4004F PT TOBACCO SCREEN RCVD TLK: CPT | Performed by: OBSTETRICS & GYNECOLOGY

## 2024-05-30 PROCEDURE — 99213 OFFICE O/P EST LOW 20 MIN: CPT | Performed by: OBSTETRICS & GYNECOLOGY

## 2024-05-30 PROCEDURE — G8419 CALC BMI OUT NRM PARAM NOF/U: HCPCS | Performed by: OBSTETRICS & GYNECOLOGY

## 2024-05-30 PROCEDURE — G8427 DOCREV CUR MEDS BY ELIG CLIN: HCPCS | Performed by: OBSTETRICS & GYNECOLOGY

## 2024-05-30 RX ORDER — VITAMIN A, VITAMIN C, VITAMIN D-3, VITAMIN E, VITAMIN B-1, VITAMIN B-2, NIACIN, VITAMIN B-6, CALCIUM, IRON, ZINC, COPPER 4000; 120; 400; 22; 1.84; 3; 20; 10; 1; 12; 200; 27; 25; 2 [IU]/1; MG/1; [IU]/1; MG/1; MG/1; MG/1; MG/1; MG/1; MG/1; UG/1; MG/1; MG/1; MG/1; MG/1
1 TABLET ORAL DAILY
Qty: 90 TABLET | Refills: 3 | Status: SHIPPED | OUTPATIENT
Start: 2024-05-30

## 2024-05-30 SDOH — ECONOMIC STABILITY: FOOD INSECURITY: WITHIN THE PAST 12 MONTHS, THE FOOD YOU BOUGHT JUST DIDN'T LAST AND YOU DIDN'T HAVE MONEY TO GET MORE.: NEVER TRUE

## 2024-05-30 SDOH — ECONOMIC STABILITY: FOOD INSECURITY: WITHIN THE PAST 12 MONTHS, YOU WORRIED THAT YOUR FOOD WOULD RUN OUT BEFORE YOU GOT MONEY TO BUY MORE.: NEVER TRUE

## 2024-05-30 SDOH — ECONOMIC STABILITY: HOUSING INSECURITY
IN THE LAST 12 MONTHS, WAS THERE A TIME WHEN YOU DID NOT HAVE A STEADY PLACE TO SLEEP OR SLEPT IN A SHELTER (INCLUDING NOW)?: NO

## 2024-05-30 SDOH — ECONOMIC STABILITY: INCOME INSECURITY: HOW HARD IS IT FOR YOU TO PAY FOR THE VERY BASICS LIKE FOOD, HOUSING, MEDICAL CARE, AND HEATING?: NOT HARD AT ALL

## 2024-05-30 ASSESSMENT — PATIENT HEALTH QUESTIONNAIRE - PHQ9
2. FEELING DOWN, DEPRESSED OR HOPELESS: NOT AT ALL
SUM OF ALL RESPONSES TO PHQ QUESTIONS 1-9: 0
SUM OF ALL RESPONSES TO PHQ QUESTIONS 1-9: 0
1. LITTLE INTEREST OR PLEASURE IN DOING THINGS: NOT AT ALL
SUM OF ALL RESPONSES TO PHQ9 QUESTIONS 1 & 2: 0
SUM OF ALL RESPONSES TO PHQ QUESTIONS 1-9: 0
SUM OF ALL RESPONSES TO PHQ QUESTIONS 1-9: 0

## 2024-05-30 ASSESSMENT — ENCOUNTER SYMPTOMS
COUGH: 0
NAUSEA: 0
ABDOMINAL DISTENTION: 0
SHORTNESS OF BREATH: 0
BLOOD IN STOOL: 0
ABDOMINAL PAIN: 0
COLOR CHANGE: 0
TROUBLE SWALLOWING: 0
VOICE CHANGE: 0
SORE THROAT: 0
CHEST TIGHTNESS: 0
BACK PAIN: 0
VOMITING: 0
WHEEZING: 0
CONSTIPATION: 0

## 2024-05-30 NOTE — PROGRESS NOTES
headaches.   Hematological:  Negative for adenopathy. Does not bruise/bleed easily.   Psychiatric/Behavioral:  Negative for agitation, behavioral problems, confusion, decreased concentration, dysphoric mood and suicidal ideas. The patient is not nervous/anxious and is not hyperactive.        Physical Exam    Vitals:    05/30/24 1101   BP: 110/70   Site: Left Upper Arm   Pulse: 82   Weight: 76.7 kg (169 lb)   Height: 1.6 m (5' 3\")         ASSESSMENT/PLAN:     Diagnosis Orders   1. Amenorrhea  CBC with Auto Differential    Culture, Urine    HCG, Quantitative, Pregnancy    Hemoglobin A1C    Hepatitis B Surface Antigen    Hepatitis C Antibody    HIV Screen    RPR    Rubella antibody, IgG    TSH    Sickle Cell Screen    Type and Screen    Urinalysis    Urine Drug Screen    Varicella Zoster Antibody, IgG    Wet prep, genital    PAP SMEAR    C.trachomatis N.gonorrhoeae DNA, Thin Prep    Prenatal Vit-Fe Fumarate-FA (PRENATAL VITAMIN PLUS LOW IRON) 27-1 MG TABS    US OB LESS THAN 14 WEEKS SINGLE OR FIRST GESTATION          PLAN: Pap smear and cultures obtained.  Patient to have an ultrasound at [facility] in 2 to 3 weeks.  Patient to return here in 1 month      No follow-ups on file.      On this date 5/30/2024 I have spent 20 minutes reviewing previous notes, test results and face to face with the patient discussing the diagnosis and importance of compliance with the treatment plan as well as documenting on the day of the visit.      An electronic signature was used to authenticate this note. Please note this report has been partially produced using speech recognition software and may cause contain errors related to that system including grammar, punctuation and spelling as well as words and phrases that may seem inappropriate. If there are questions or concerns please feel free to contact me to clarify.

## 2024-05-31 LAB
CLUE CELLS VAG QL WET PREP: ABNORMAL
T VAGINALIS VAG QL WET PREP: ABNORMAL
TRICHOMONAS VAGINALIS SCREEN: NEGATIVE
YEAST VAG QL WET PREP: ABNORMAL

## 2024-06-04 ENCOUNTER — TELEPHONE (OUTPATIENT)
Dept: OBGYN CLINIC | Age: 29
End: 2024-06-04

## 2024-06-04 LAB
C TRACH DNA CVX QL NAA+PROBE: NEGATIVE
HPV HR 12 DNA SPEC QL NAA+PROBE: NOT DETECTED
HPV16 DNA SPEC QL NAA+PROBE: NOT DETECTED
HPV16+18+H RISK 12 DNA SPEC-IMP: NORMAL
HPV18 DNA SPEC QL NAA+PROBE: NOT DETECTED
N GONORRHOEA DNA SPEC QL NAA+PROBE: NEGATIVE

## 2024-06-04 RX ORDER — ONDANSETRON HYDROCHLORIDE 8 MG/1
8 TABLET, FILM COATED ORAL EVERY 8 HOURS PRN
Qty: 90 TABLET | Refills: 1 | Status: SHIPPED | OUTPATIENT
Start: 2024-06-04

## 2024-06-04 RX ORDER — BUTALBITAL, ACETAMINOPHEN AND CAFFEINE 50; 325; 40 MG/1; MG/1; MG/1
1 TABLET ORAL EVERY 4 HOURS PRN
Qty: 180 TABLET | Refills: 1 | Status: SHIPPED | OUTPATIENT
Start: 2024-06-04

## 2024-06-04 NOTE — TELEPHONE ENCOUNTER
Pt called and stated she was supposed to get zofran and something for headaches . Please advise, [facility] [address]

## (undated) DEVICE — LINER PAD CONTOUR SUPER PEACH 7X14IN

## (undated) DEVICE — CATHETER,URETHRAL,VINYL,FEMALE,6",14FR: Brand: MEDLINE

## (undated) DEVICE — TOWEL,OR,DSP,ST,BLUE,STD,4/PK,20PK/CS: Brand: MEDLINE

## (undated) DEVICE — SET FLD CTRL SYS INFLO AND OUTFLO TB AQUILEX

## (undated) DEVICE — COVER LT HNDL BLU PLAS

## (undated) DEVICE — GOWN,AURORA,NONRNF,XL,30/CS: Brand: MEDLINE

## (undated) DEVICE — PACK,BASIC: Brand: MEDLINE

## (undated) DEVICE — GAUZE,SPONGE,4"X4",16PLY,XRAY,STRL,LF: Brand: MEDLINE

## (undated) DEVICE — GLOVE ORANGE PI 7 1/2   MSG9075

## (undated) DEVICE — WARMER SCP 2 ANTIFOG LAP DISP

## (undated) DEVICE — KIT CANSTR VAC TANTEM TB FOR AQUILEX FLD CTRL SYS

## (undated) DEVICE — SET ENDOSCP SEAL HYSTEROSCOPE RIG OUTFLO CHN DISP MYOSURE

## (undated) DEVICE — PAD N ADH W3XL4IN POLY COT SFT PERF FLM EASILY CUT ABSRB

## (undated) DEVICE — LABEL MED MINI W/ MARKER

## (undated) DEVICE — SKIN PREP TRAY 4 COMPARTM TRAY: Brand: MEDLINE INDUSTRIES, INC.

## (undated) DEVICE — LITHOTOMY DRAPE WITH FLUID CONTROL POUCH: Brand: CONVERTORS